# Patient Record
Sex: FEMALE | Race: BLACK OR AFRICAN AMERICAN | Employment: FULL TIME | URBAN - METROPOLITAN AREA
[De-identification: names, ages, dates, MRNs, and addresses within clinical notes are randomized per-mention and may not be internally consistent; named-entity substitution may affect disease eponyms.]

---

## 2017-04-21 ENCOUNTER — APPOINTMENT (OUTPATIENT)
Dept: GENERAL RADIOLOGY | Age: 27
End: 2017-04-21
Attending: EMERGENCY MEDICINE
Payer: SUBSIDIZED

## 2017-04-21 ENCOUNTER — HOSPITAL ENCOUNTER (EMERGENCY)
Age: 27
Discharge: HOME | End: 2017-04-21
Attending: STUDENT IN AN ORGANIZED HEALTH CARE EDUCATION/TRAINING PROGRAM
Payer: SUBSIDIZED

## 2017-04-21 VITALS
HEIGHT: 63 IN | BODY MASS INDEX: 51.03 KG/M2 | WEIGHT: 288 LBS | RESPIRATION RATE: 16 BRPM | SYSTOLIC BLOOD PRESSURE: 167 MMHG | OXYGEN SATURATION: 98 % | DIASTOLIC BLOOD PRESSURE: 95 MMHG | HEART RATE: 66 BPM | TEMPERATURE: 97.7 F

## 2017-04-21 DIAGNOSIS — M79.89 FOOT SWELLING: Primary | ICD-10-CM

## 2017-04-21 LAB
ALBUMIN SERPL BCP-MCNC: 3.4 G/DL (ref 3.5–5)
ALBUMIN/GLOB SERPL: 0.7 {RATIO} (ref 1.1–2.2)
ALP SERPL-CCNC: 50 U/L (ref 45–117)
ALT SERPL-CCNC: 24 U/L (ref 12–78)
ANION GAP BLD CALC-SCNC: 6 MMOL/L (ref 5–15)
APPEARANCE UR: CLEAR
AST SERPL W P-5'-P-CCNC: 20 U/L (ref 15–37)
BACTERIA URNS QL MICRO: NEGATIVE /HPF
BASOPHILS # BLD AUTO: 0 K/UL (ref 0–0.1)
BASOPHILS # BLD: 1 % (ref 0–1)
BILIRUB SERPL-MCNC: 0.2 MG/DL (ref 0.2–1)
BILIRUB UR QL: NEGATIVE
BNP SERPL-MCNC: 19 PG/ML (ref 0–100)
BUN SERPL-MCNC: 14 MG/DL (ref 6–20)
BUN/CREAT SERPL: 15 (ref 12–20)
CALCIUM SERPL-MCNC: 9.7 MG/DL (ref 8.5–10.1)
CHLORIDE SERPL-SCNC: 100 MMOL/L (ref 97–108)
CO2 SERPL-SCNC: 30 MMOL/L (ref 21–32)
COLOR UR: NORMAL
CREAT SERPL-MCNC: 0.95 MG/DL (ref 0.55–1.02)
EOSINOPHIL # BLD: 0.1 K/UL (ref 0–0.4)
EOSINOPHIL NFR BLD: 2 % (ref 0–7)
EPITH CASTS URNS QL MICRO: NORMAL /LPF
ERYTHROCYTE [DISTWIDTH] IN BLOOD BY AUTOMATED COUNT: 14.5 % (ref 11.5–14.5)
GLOBULIN SER CALC-MCNC: 4.7 G/DL (ref 2–4)
GLUCOSE SERPL-MCNC: 95 MG/DL (ref 65–100)
GLUCOSE UR STRIP.AUTO-MCNC: NEGATIVE MG/DL
HCG UR QL: NEGATIVE
HCT VFR BLD AUTO: 35.5 % (ref 35–47)
HGB BLD-MCNC: 11.3 G/DL (ref 11.5–16)
HGB UR QL STRIP: NEGATIVE
KETONES UR QL STRIP.AUTO: NEGATIVE MG/DL
LEUKOCYTE ESTERASE UR QL STRIP.AUTO: NEGATIVE
LYMPHOCYTES # BLD AUTO: 47 % (ref 12–49)
LYMPHOCYTES # BLD: 2.9 K/UL (ref 0.8–3.5)
MCH RBC QN AUTO: 28.8 PG (ref 26–34)
MCHC RBC AUTO-ENTMCNC: 31.8 G/DL (ref 30–36.5)
MCV RBC AUTO: 90.6 FL (ref 80–99)
MONOCYTES # BLD: 0.3 K/UL (ref 0–1)
MONOCYTES NFR BLD AUTO: 5 % (ref 5–13)
NEUTS SEG # BLD: 2.9 K/UL (ref 1.8–8)
NEUTS SEG NFR BLD AUTO: 45 % (ref 32–75)
NITRITE UR QL STRIP.AUTO: NEGATIVE
PH UR STRIP: 6 [PH] (ref 5–8)
PLATELET # BLD AUTO: 338 K/UL (ref 150–400)
POTASSIUM SERPL-SCNC: 3.5 MMOL/L (ref 3.5–5.1)
PROT SERPL-MCNC: 8.1 G/DL (ref 6.4–8.2)
PROT UR STRIP-MCNC: NEGATIVE MG/DL
RBC # BLD AUTO: 3.92 M/UL (ref 3.8–5.2)
RBC #/AREA URNS HPF: NORMAL /HPF (ref 0–5)
SODIUM SERPL-SCNC: 136 MMOL/L (ref 136–145)
SP GR UR REFRACTOMETRY: 1.02 (ref 1–1.03)
UROBILINOGEN UR QL STRIP.AUTO: 0.2 EU/DL (ref 0.2–1)
WBC # BLD AUTO: 6.3 K/UL (ref 3.6–11)
WBC URNS QL MICRO: NORMAL /HPF (ref 0–4)

## 2017-04-21 PROCEDURE — 71020 XR CHEST PA LAT: CPT

## 2017-04-21 PROCEDURE — 99283 EMERGENCY DEPT VISIT LOW MDM: CPT

## 2017-04-21 PROCEDURE — 83880 ASSAY OF NATRIURETIC PEPTIDE: CPT | Performed by: EMERGENCY MEDICINE

## 2017-04-21 PROCEDURE — 81001 URINALYSIS AUTO W/SCOPE: CPT | Performed by: EMERGENCY MEDICINE

## 2017-04-21 PROCEDURE — 85025 COMPLETE CBC W/AUTO DIFF WBC: CPT | Performed by: EMERGENCY MEDICINE

## 2017-04-21 PROCEDURE — 36415 COLL VENOUS BLD VENIPUNCTURE: CPT | Performed by: EMERGENCY MEDICINE

## 2017-04-21 PROCEDURE — 81025 URINE PREGNANCY TEST: CPT

## 2017-04-21 PROCEDURE — 80053 COMPREHEN METABOLIC PANEL: CPT | Performed by: EMERGENCY MEDICINE

## 2017-04-21 NOTE — ED TRIAGE NOTES
Patient presents to the emergency department reporting left leg swelling for a week. Patient states she has been seen at urgent care twice. She has been on an antibiotic and had an ultrasound for blood clot. Ultrasound was negative. Patient denies shortness of breath.

## 2017-04-22 NOTE — DISCHARGE INSTRUCTIONS
We hope that we have addressed all of your medical concerns. The examination and treatment you received in the Emergency Department were for an emergent problem and were not intended as complete care. It is important that you follow up with your healthcare provider(s) for ongoing care. If your symptoms worsen or do not improve as expected, and you are unable to reach your usual health care provider(s), you should return to the Emergency Department. Today's healthcare is undergoing tremendous change, and patient satisfaction surveys are one of the many tools to assess the quality of medical care. You may receive a survey from the [facility] regarding your experience in the Emergency Department. I hope that your experience has been completely positive, particularly the medical care that I provided. As such, please participate in the survey; anything less than excellent does not meet my expectations or intentions. Replaced by [facility] and [address] [place] [address] participate in nationally recognized quality of care measures. If your blood pressure is greater than 120/80, as reported below, we urge that you seek medical care to address the potential of high blood pressure, commonly known as hypertension. Hypertension can be hereditary or can be caused by certain medical conditions, pain, stress, or \"white coat syndrome. \"       Please make an appointment with your health care provider(s) for follow up of your Emergency Department visit. VITALS:   Patient Vitals for the past 8 hrs:   Temp Pulse Resp BP SpO2   04/21/17 1948 98.1 °F (36.7 °C) 67 16 157/87 95 %          Thank you for allowing us to provide you with medical care today. We realize that you have many choices for your emergency care needs. Please choose us in the future for any continued health care needs. Verona Rodrigues, [address] [place] [address] [address]   Office: [phone]            Recent Results (from the past 24 hour(s))   CBC WITH AUTOMATED DIFF    Collection Time: 04/21/17  8:12 PM   Result Value Ref Range    WBC 6.3 3.6 - 11.0 K/uL    RBC 3.92 3.80 - 5.20 M/uL    HGB 11.3 (L) 11.5 - 16.0 g/dL    HCT 35.5 35.0 - 47.0 %    MCV 90.6 80.0 - 99.0 FL    MCH 28.8 26.0 - 34.0 PG    MCHC 31.8 30.0 - 36.5 g/dL    RDW 14.5 11.5 - 14.5 %    PLATELET 814 248 - 624 K/uL    NEUTROPHILS 45 32 - 75 %    LYMPHOCYTES 47 12 - 49 %    MONOCYTES 5 5 - 13 %    EOSINOPHILS 2 0 - 7 %    BASOPHILS 1 0 - 1 %    ABS. NEUTROPHILS 2.9 1.8 - 8.0 K/UL    ABS. LYMPHOCYTES 2.9 0.8 - 3.5 K/UL    ABS. MONOCYTES 0.3 0.0 - 1.0 K/UL    ABS. EOSINOPHILS 0.1 0.0 - 0.4 K/UL    ABS. BASOPHILS 0.0 0.0 - 0.1 K/UL   METABOLIC PANEL, COMPREHENSIVE    Collection Time: 04/21/17  8:12 PM   Result Value Ref Range    Sodium 136 136 - 145 mmol/L    Potassium 3.5 3.5 - 5.1 mmol/L    Chloride 100 97 - 108 mmol/L    CO2 30 21 - 32 mmol/L    Anion gap 6 5 - 15 mmol/L    Glucose 95 65 - 100 mg/dL    BUN 14 6 - 20 MG/DL    Creatinine 0.95 0.55 - 1.02 MG/DL    BUN/Creatinine ratio 15 12 - 20      GFR est AA >60 >60 ml/min/1.73m2    GFR est non-AA >60 >60 ml/min/1.73m2    Calcium 9.7 8.5 - 10.1 MG/DL    Bilirubin, total 0.2 0.2 - 1.0 MG/DL    ALT (SGPT) 24 12 - 78 U/L    AST (SGOT) 20 15 - 37 U/L    Alk.  phosphatase 50 45 - 117 U/L    Protein, total 8.1 6.4 - 8.2 g/dL    Albumin 3.4 (L) 3.5 - 5.0 g/dL    Globulin 4.7 (H) 2.0 - 4.0 g/dL    A-G Ratio 0.7 (L) 1.1 - 2.2     BNP    Collection Time: 04/21/17  8:12 PM   Result Value Ref Range    BNP 19 0 - 100 pg/mL   URINALYSIS W/MICROSCOPIC    Collection Time: 04/21/17  9:08 PM   Result Value Ref Range    Color YELLOW/STRAW      Appearance CLEAR CLEAR      Specific gravity 1.024 1.003 - 1.030      pH (UA) 6.0 5.0 - 8.0      Protein NEGATIVE  NEG mg/dL    Glucose NEGATIVE  NEG mg/dL    Ketone NEGATIVE  NEG mg/dL    Bilirubin NEGATIVE  NEG      Blood NEGATIVE  NEG      Urobilinogen 0.2 0.2 - 1.0 EU/dL    Nitrites NEGATIVE  NEG      Leukocyte Esterase NEGATIVE  NEG      WBC 0-4 0 - 4 /hpf    RBC 0-5 0 - 5 /hpf    Epithelial cells FEW FEW /lpf    Bacteria NEGATIVE  NEG /hpf   HCG URINE, QL. - POC    Collection Time: 04/21/17  9:09 PM   Result Value Ref Range    Pregnancy test,urine (POC) NEGATIVE  NEG         Xr Chest Pa Lat    Result Date: 4/21/2017  CLINICAL HISTORY: Foot swelling INDICATION: Foot swelling, history of asthma COMPARISON: None FINDINGS: PA and lateral views of the chest are obtained. The cardiopericardial silhouette is within normal limits. There is no pleural effusion, pneumothorax or focal consolidation present. IMPRESSION: No acute intrathoracic disease. Leg and Ankle Edema: Care Instructions  Your Care Instructions  Swelling in the legs, ankles, and feet is called edema. It is common after you sit or stand for a while. Long plane flights or car rides often cause swelling in the legs and feet. You may also have swelling if you have to stand for long periods of time at your job. Problems with the veins in the legs (varicose veins) and changes in hormones can also cause swelling. Sometimes the swelling in the ankles and feet is caused by a more serious problem, such as heart failure, infection, blood clots, or liver or kidney disease. Follow-up care is a key part of your treatment and safety. Be sure to make and go to all appointments, and call your doctor if you are having problems. Its also a good idea to know your test results and keep a list of the medicines you take. How can you care for yourself at home? · If your doctor gave you medicine, take it as prescribed. Call your doctor if you think you are having a problem with your medicine. · Whenever you are resting, raise your legs up. Try to keep the swollen area higher than the level of your heart. · Take breaks from standing or sitting in one position.   ¨ Walk around to increase the blood flow in your lower legs. ¨ Move your feet and ankles often while you stand, or tighten and relax your leg muscles. · Wear support stockings. Put them on in the morning, before swelling gets worse. · Eat a balanced diet. Lose weight if you need to. · Limit the amount of salt (sodium) in your diet. Salt holds fluid in the body and may increase swelling. When should you call for help? Call 911 anytime you think you may need emergency care. For example, call if:  · You have symptoms of a blood clot in your lung (called a pulmonary embolism). These may include:  ¨ Sudden chest pain. ¨ Trouble breathing. ¨ Coughing up blood. Call your doctor now or seek immediate medical care if:  · You have signs of a blood clot, such as:  ¨ Pain in your calf, back of the knee, thigh, or groin. ¨ Redness and swelling in your leg or groin. · You have symptoms of infection, such as:  ¨ Increased pain, swelling, warmth, or redness. ¨ Red streaks or pus. ¨ A fever. Watch closely for changes in your health, and be sure to contact your doctor if:  · Your swelling is getting worse. · You have new or worsening pain in your legs. · You do not get better as expected. Where can you learn more? Go to http://ruben-frank.info/. Enter S548 in the search box to learn more about \"Leg and Ankle Edema: Care Instructions. \"  Current as of: May 27, 2016  Content Version: 11.2  © 8035-3929 [employer]. Care instructions adapted under license by [employer] (which disclaims liability or warranty for this information). If you have questions about a medical condition or this instruction, always ask your healthcare professional. [place] [address] any warranty or liability for your use of this information.

## 2017-04-22 NOTE — ED PROVIDER NOTES
HPI Comments: 31 y.o. female with past medical history significant for HTN, asthma and migraines who presents from home with chief complaint of leg swelling. Pt complains of swelling and tenderness in her left foot for one week. Pt states that she has been able to ambulate with difficulty. Pt also complains of chills last Friday (~one week ago). Pt reports that she was told that she has a \"skin infection\" by one doctor and was prescribed an antibiotic, which caused an allergic reaction. Pt states that she was then changed to another antibiotic, which caused a similar reaction. Pt reports that she had an ultrasound done at [address] [place] [address] that showed negative results. Pt also reports that she is waiting for results of an x-ray that was done at [facility] [address]. Pt denies any recent fall or injury to the foot. Pt also denies any knee pain, SOB or abdominal pain. There are no other acute medical concerns at this time. Pain rated 8/10. PCP: Elva Lange MD    Social hx  Nonsmoker  No alcohol    Note written by Kathie Kang, as dictated by SANDRA Wagoner 8:17 PM        The history is provided by the patient. No  was used. Past Medical History:   Diagnosis Date    Asthma     Hypertension     Neurological disorder     Migraines       History reviewed. No pertinent surgical history. Family History:   Problem Relation Age of Onset    Heart Disease Mother     Hypertension Mother    Dewey Sanchez Arthritis-osteo Father     Hypertension Father        Social History     Social History    Marital status: SINGLE     Spouse name: N/A    Number of children: N/A    Years of education: N/A     Occupational History    Not on file.      Social History Main Topics    Smoking status: Never Smoker    Smokeless tobacco: Never Used    Alcohol use No    Drug use: No    Sexual activity: Yes     Partners: Male     Other Topics Concern    Not on file     Social History Narrative ALLERGIES: Review of patient's allergies indicates no known allergies. Review of Systems   Constitutional: Negative for appetite change and fever. Chills: resolved. HENT: Negative for congestion, rhinorrhea and sore throat. Respiratory: Negative for chest tightness and shortness of breath. Cardiovascular: Positive for leg swelling. Negative for chest pain and palpitations. Gastrointestinal: Negative for abdominal pain, diarrhea, nausea and vomiting. Genitourinary: Negative for difficulty urinating, dysuria, pelvic pain and urgency. Musculoskeletal: Negative for back pain, gait problem, neck pain and neck stiffness. Foot pain   Skin: Positive for color change. Neurological: Negative for dizziness, weakness, light-headedness and headaches. All other systems reviewed and are negative. Vitals:    04/21/17 1948   BP: 157/87   Pulse: 67   Resp: 16   Temp: 98.1 °F (36.7 °C)   SpO2: 95%   Weight: 130.6 kg (288 lb)   Height: 5' 3\" (1.6 m)            Physical Exam   Constitutional: She is oriented to person, place, and time. She appears well-developed and well-nourished. No distress. HENT:   Head: Normocephalic and atraumatic. Right Ear: External ear normal.   Left Ear: External ear normal.   Eyes: Conjunctivae and EOM are normal. Pupils are equal, round, and reactive to light. Neck: Normal range of motion. Neck supple. Cardiovascular: Normal rate and regular rhythm. Pulmonary/Chest: Effort normal and breath sounds normal.   Musculoskeletal: Normal range of motion. Left foot:  No redness or warmth. Soft tissue swelling 2+ pitting edema to foot and ankle. No calf tenderness. No open sores or lesions to leg. Tender to palpation. Neurological: She is alert and oriented to person, place, and time. No cranial nerve deficit. She exhibits normal muscle tone. Skin: Skin is warm and dry. No rash noted. No erythema. Psychiatric: She has a normal mood and affect.  Her behavior is normal. Judgment and thought content normal.   Nursing note and vitals reviewed. MDM  Number of Diagnoses or Management Options  Foot swelling:   Diagnosis management comments: 30-year-old female presenting to the emergency department for evaluation of left foot swelling for one week. No fever or chills. No chest pain or shortness of breath. There is 2+ pitting edema of the left foot and ankle. She is neurovascularly intact. No warmth. No signs of infection. No open wounds. Plan: Chest x-ray, labs, urinalysis    Labs and imaging unremarkable. Pt has had negative xray of foot and ultrasound of leg today. Will discharge home with elevation of foot and continuation of antibiotics at home. Patient's results have been reviewed with them. Patient and/or family have verbally conveyed their understanding and agreement of the patient's signs, symptoms, diagnosis, treatment and prognosis and additionally agree to follow up as recommended or return to the Emergency Room should their condition change prior to follow-up. Discharge instructions have also been provided to the patient with some educational information regarding their diagnosis as well a list of reasons why they would want to return to the ER prior to their follow-up appointment should their condition change. Amount and/or Complexity of Data Reviewed  Discuss the patient with other providers: yes (ER attending-Amado)    Patient Progress  Patient progress: stable    ED Course       Procedures         Pt case including HPI, PE, and all available lab and radiology results has been discussed with attending physician. Opportunity to evaluate patient has been provided to ER attending. Discharge and prescription plan has been agreed upon.

## 2017-04-26 ENCOUNTER — PATIENT OUTREACH (OUTPATIENT)
Dept: INTERNAL MEDICINE CLINIC | Age: 27
End: 2017-04-26

## 2017-04-26 NOTE — PROGRESS NOTES
NNTOCED      Date/Time:  4/26/2017 11:37 AM    Patient listed on discharge [facility])   Patient discharged from [facility] for Foot Swelling. NN LM on  for return call. Needs to verify Care Card Application and Post ED Discharge Assessment. Past Medical History:   Diagnosis Date    Asthma     Hypertension     Neurological disorder     Migraines     Case management plan: Attempt to contact the patient by telephone or during office visit within the next 7-10 days. Will continue to follow as necessary for the next 30 days. Will reassess for case management needs prior to discharge from case management service on or about 30 days. Red Flags:  Keep leg elevated to minimize swelling. Continue your antibiotics and pain medicine. Return to ER for any fever, chills, redness or warmth to foot or leg, chest pain, shortness of breath.

## 2017-06-13 ENCOUNTER — OFFICE VISIT (OUTPATIENT)
Dept: INTERNAL MEDICINE CLINIC | Age: 27
End: 2017-06-13

## 2017-06-13 VITALS
HEIGHT: 63 IN | SYSTOLIC BLOOD PRESSURE: 143 MMHG | WEIGHT: 290 LBS | OXYGEN SATURATION: 97 % | HEART RATE: 65 BPM | BODY MASS INDEX: 51.38 KG/M2 | RESPIRATION RATE: 20 BRPM | TEMPERATURE: 97.8 F | DIASTOLIC BLOOD PRESSURE: 96 MMHG

## 2017-06-13 DIAGNOSIS — K62.5 BRIGHT RED BLOOD PER RECTUM: ICD-10-CM

## 2017-06-13 DIAGNOSIS — R19.5 FECAL OCCULT BLOOD TEST POSITIVE: Primary | ICD-10-CM

## 2017-06-13 DIAGNOSIS — R10.31 RIGHT LOWER QUADRANT ABDOMINAL PAIN: ICD-10-CM

## 2017-06-13 LAB
HEMOCCULT STL QL: POSITIVE
VALID INTERNAL CONTROL?: YES

## 2017-06-13 RX ORDER — AMOXICILLIN AND CLAVULANATE POTASSIUM 875; 125 MG/1; MG/1
1 TABLET, FILM COATED ORAL EVERY 12 HOURS
Qty: 20 TAB | Refills: 0 | Status: SHIPPED | OUTPATIENT
Start: 2017-06-13 | End: 2017-06-23

## 2017-06-13 NOTE — PROGRESS NOTES
Ms. Disha Wakefield is a 31y.o. year old female who had concerns including Other and Abdominal Pain. HPI:  Chief Complaint   Patient presents with    Other     bloody stools    Abdominal Pain     around the umbilicus     Went to Er, 4 bloody stools since yesterday . BRBPR,  In toilet stool, no just with wiping. No current pain, but it was presetntprior to ER visit. Started on RLW, then hsarp shooting around epigastric pain     CT scan - no appendicitis. Constipation last  Night, none prior. Patient reports that she is on a medication she does not recall the name of it. Past Medical History:   Diagnosis Date    Asthma     Hypertension     Neurological disorder     Migraines     Current Outpatient Prescriptions   Medication Sig Dispense    albuterol (PROVENTIL HFA, VENTOLIN HFA, PROAIR HFA) 90 mcg/actuation inhaler Take 2 Puffs by inhalation every four (4) hours as needed for Wheezing. 1 Inhaler    methylPREDNISolone (MEDROL, STELLA,) 4 mg tablet As directed 1 Dose Pack    SUMAtriptan (IMITREX) 20 mg/actuation nasal spray 1 [place] by Both Nostrils route as needed for Migraine. 1 spray in each nostril x1;  may repeat dose x1 after 2h  Indications: MIGRAINE 1 Container    topiramate (TOPAMAX) 50 mg tablet Take 1 Tab by mouth two (2) times a day. 60 Tab    lisinopril (PRINIVIL, ZESTRIL) 40 mg tablet Take 0.5 Tabs by mouth daily. 90 Tab    ergocalciferol (ERGOCALCIFEROL) 50,000 unit capsule Take 1 capsule daily x 7 days, then take 1 capsule weekly x 7 weeks. Indications: VITAMIN D DEFICIENCY (HIGH DOSE THERAPY) 14 Cap    hydrochlorothiazide (HYDRODIURIL) 25 mg tablet Take 1 Tab by mouth daily. Indications: HYPERTENSION 90 Tab     No current facility-administered medications for this visit. Reviewed PmHx, RxHx, FmHx, SocHx, AllgHx and updated and dated in the chart.     ROS: Negative except for BOLD  General: fever, chills, fatigue  Respiratory: cough, SOB, wheezing  Cardiovascular:  CP, palpitation, MANZO, edema   Gastrointestinal: N/V/D, bleeding  Genito-Urinary: dysuria, hematuria  Musculoskeletal: muscle weakness, pain, swelling    OBJECTIVE:   Visit Vitals    BP (!) 143/96 (BP 1 Location: Right arm, BP Patient Position: Sitting)    Pulse 65    Temp 97.8 °F (36.6 °C) (Oral)    Resp 20    Ht 5' 3\" (1.6 m)    Wt 290 lb (131.5 kg)    SpO2 97%    BMI 51.37 kg/m2     GEN: The patient appears well, alert, oriented x 3, in mild discomfort. ENT: bilateral TM and canal normal.  Neck supple. No adenopathy or thyromegaly. CAM[place]. Lungs: clear bilaterally, good air entry, no wheezes, rhonchi or rales. Cardiovascular: regular rate and rhythm. S1 and S2 normal, no murmurs,  Abdomen: + BS, soft with hypogastric tenderness, guarding, rebound, mass or organomegaly. FOBT positive   CV: extremities: no edema, normal peripheral pulses. Neurological: normal, gross sensory and motor in tact without focal findings. Assessment/ Plan:       ICD-10-CM ICD-9-CM    1. Fecal occult blood test positive R19.5 792.1 REFERRAL TO GASTROENTEROLOGY      AMB POC FECAL BLOOD, OCCULT, QL 1 CARD   2. Bright red blood per rectum K62.5 569.3 REFERRAL TO GASTROENTEROLOGY      AMB POC FECAL BLOOD, OCCULT, QL 1 CARD   3. Right lower quadrant abdominal pain R10.31 789.03 REFERRAL TO GASTROENTEROLOGY       Medical release form for him Lynda for abdominal CT scan. Refer for GI to determine cause of GI bleed. Afebrile CT scan reportedly negative for colitis and diverticulitis. Hosptial medication - ketorolac, pt admonished to avoid NSAIDs due to GI bleed risk worsening    I have discussed the diagnosis with the patient and the intended plan as seen in the above orders. The patient has received an after-visit summary and questions were answered concerning future plans. Medication Side Effects and Warnings were discussed with patient. Follow-up Disposition:  Return if symptoms worsen or fail to improve.       Will Low Christiano Hilliard MD

## 2017-06-13 NOTE — PATIENT INSTRUCTIONS
Rectal Bleeding: Care Instructions  Your Care Instructions  Rectal bleeding in small amounts is common. You may see red spotting on toilet paper or drops of blood in the toilet. Rectal bleeding has many possible causes, from something as minor as hemorrhoids to something as serious as colon cancer. You may need more tests to find the cause of your bleeding. Follow-up care is a key part of your treatment and safety. Be sure to make and go to all appointments, and call your doctor if you are having problems. Its also a good idea to know your test results and keep a list of the medicines you take. How can you care for yourself at home? · Avoid aspirin and other nonsteroidal anti-inflammatory drugs (NSAIDs), such as ibuprofen (Advil, Motrin) and naproxen (Aleve). They can cause you to bleed more. Ask your doctor if you can take acetaminophen (Tylenol). Read and follow all instructions on the label. · Use a stool softener that contains bran or psyllium. You can save money by buying bran or psyllium (available in bulk at most health food stores) and sprinkling it on foods or stirring it into fruit juice. You can also use a product such as Metamucil or Citrucel. · Take your medicines exactly as directed. Call your doctor if you think you are having a problem with your medicine. When should you call for help? Call 911 anytime you think you may need emergency care. For example, call if:  · You passed out (lost consciousness). · You pass maroon or very bloody stools. · You vomit blood or what looks like coffee grounds. Call your doctor now or seek immediate medical care if:  · You have severe belly pain. · You have increased bleeding. · You are dizzy or lightheaded, or you feel like you may faint. · Your stools are black and tarlike. Watch closely for changes in your health, and be sure to contact your doctor if:  · You lose weight and do not know why. · You feel tired all the time.   · Your bleeding does not decrease after 2 days. Where can you learn more? Go to http://ruben-frank.info/. Enter A298 in the search box to learn more about \"Rectal Bleeding: Care Instructions. \"  Current as of: August 9, 2016  Content Version: 11.2  © 1664-5826 [employer]. Care instructions adapted under license by [employer] (which disclaims liability or warranty for this information). If you have questions about a medical condition or this instruction, always ask your healthcare professional. [address] any warranty or liability for your use of this information.

## 2017-06-13 NOTE — MR AVS SNAPSHOT
Visit Information Date & Time Provider Department Dept. Phone Encounter #  
 6/13/2017  3:00 PM Idalmis Montoya MD [facility] and [place] [id number] [id number] Follow-up Instructions Return if symptoms worsen or fail to improve. Follow-up and Disposition History Upcoming Health Maintenance Date Due DTaP/Tdap/Td series (1 - Tdap) 11/9/2010 INFLUENZA AGE 9 TO ADULT 8/1/2017 PAP AKA CERVICAL CYTOLOGY 5/18/2019 Allergies as of 6/13/2017  Review Complete On: 6/13/2017 By: Isa Puga LPN No Known Allergies Current Immunizations  Never Reviewed No immunizations on file. Not reviewed this visit You Were Diagnosed With   
  
 Codes Comments Fecal occult blood test positive    -  Primary ICD-10-CM: R19.5 ICD-9-CM: 792.1 Bright red blood per rectum     ICD-10-CM: K62.5 ICD-9-CM: 569.3 Right lower quadrant abdominal pain     ICD-10-CM: R10.31 ICD-9-CM: 789.03 Vitals BP Pulse Temp Resp Height(growth percentile) Weight(growth percentile) (!) 143/96 (BP 1 Location: Right arm, BP Patient Position: Sitting) 65 97.8 °F (36.6 °C) (Oral) 20 5' 3\" (1.6 m) 290 lb (131.5 kg) SpO2 BMI OB Status Smoking Status 97% 51.37 kg/m2 Unknown Never Smoker Vitals History BMI and BSA Data Body Mass Index Body Surface Area  
 51.37 kg/m 2 2.42 m 2 Preferred Pharmacy Pharmacy Name Phone [address] [phone] Your Updated Medication List  
  
   
This list is accurate as of: 6/13/17  4:08 PM.  Always use your most recent med list.  
  
  
  
  
 albuterol 90 mcg/actuation inhaler Commonly known as:  PROVENTIL HFA, VENTOLIN HFA, PROAIR HFA Take 2 Puffs by inhalation every four (4) hours as needed for Wheezing. amoxicillin-clavulanate 875-125 mg per tablet Commonly known as:  AUGMENTIN  
 Take 1 Tab by mouth every twelve (12) hours for 10 days. ergocalciferol 50,000 unit capsule Commonly known as:  ERGOCALCIFEROL Take 1 capsule daily x 7 days, then take 1 capsule weekly x 7 weeks. Indications: VITAMIN D DEFICIENCY (HIGH DOSE THERAPY)  
  
 hydroCHLOROthiazide 25 mg tablet Commonly known as:  HYDRODIURIL Take 1 Tab by mouth daily. Indications: HYPERTENSION  
  
 lisinopril 40 mg tablet Commonly known as:  Dany Apo Take 0.5 Tabs by mouth daily. methylPREDNISolone 4 mg tablet Commonly known as:  MEDROL (STELLA) As directed SUMAtriptan 20 mg/actuation nasal spray Commonly known as:  IMITREX  
1 [place] by Both Nostrils route as needed for Migraine. 1 spray in each nostril x1;  may repeat dose x1 after 2h  Indications: MIGRAINE  
  
 topiramate 50 mg tablet Commonly known as:  TOPAMAX Take 1 Tab by mouth two (2) times a day. Prescriptions Sent to Pharmacy Refills  
 amoxicillin-clavulanate (AUGMENTIN) 875-125 mg per tablet 0 Sig: Take 1 Tab by mouth every twelve (12) hours for 10 days. Class: Normal  
 Pharmacy: [address], [address] [place] [address] Ph #: [phone] Route: Oral  
  
We Performed the Following AMB POC FECAL BLOOD, OCCULT, QL 1 CARD [27541 CPT(R)] REFERRAL TO GASTROENTEROLOGY [VHE61 Custom] Comments:  
 Bright red blood per rectum, fecal occult blood test positive. Abdominal pain. Evaluate cause of GI bleed. Follow-up Instructions Return if symptoms worsen or fail to improve. Referral Information Referral ID Referred By Referred To  
  
 [id number] [facility] [address] [place] [address] [phone] Visits Status Start Date End Date 1 New Request 6/13/17 6/13/18  If your referral has a status of pending review or denied, additional information will be sent to support the outcome of this decision. Patient Instructions Rectal Bleeding: Care Instructions Your Care Instructions Rectal bleeding in small amounts is common. You may see red spotting on toilet paper or drops of blood in the toilet. Rectal bleeding has many possible causes, from something as minor as hemorrhoids to something as serious as colon cancer. You may need more tests to find the cause of your bleeding. Follow-up care is a key part of your treatment and safety. Be sure to make and go to all appointments, and call your doctor if you are having problems. Its also a good idea to know your test results and keep a list of the medicines you take. How can you care for yourself at home? · Avoid aspirin and other nonsteroidal anti-inflammatory drugs (NSAIDs), such as ibuprofen (Advil, Motrin) and naproxen (Aleve). They can cause you to bleed more. Ask your doctor if you can take acetaminophen (Tylenol). Read and follow all instructions on the label. · Use a stool softener that contains bran or psyllium. You can save money by buying bran or psyllium (available in bulk at most health food stores) and sprinkling it on foods or stirring it into fruit juice. You can also use a product such as Metamucil or Citrucel. · Take your medicines exactly as directed. Call your doctor if you think you are having a problem with your medicine. When should you call for help? Call 911 anytime you think you may need emergency care. For example, call if: 
· You passed out (lost consciousness). · You pass maroon or very bloody stools. · You vomit blood or what looks like coffee grounds. Call your doctor now or seek immediate medical care if: 
· You have severe belly pain. · You have increased bleeding. · You are dizzy or lightheaded, or you feel like you may faint. · Your stools are black and tarlike.  
Watch closely for changes in your health, and be sure to contact your doctor if: 
· You lose weight and do not know why. · You feel tired all the time. · Your bleeding does not decrease after 2 days. Where can you learn more? Go to http://ruben-frank.info/. Enter U353 in the search box to learn more about \"Rectal Bleeding: Care Instructions. \" Current as of: August 9, 2016 Content Version: 11.2 © 7575-8235 Restore Water. Care instructions adapted under license by [employer] (which disclaims liability or warranty for this information). If you have questions about a medical condition or this instruction, always ask your healthcare professional. [address] any warranty or liability for your use of this information. Introducing [facility] & HEALTH SERVICES! Dear Tea Higginbotham: 
Thank you for requesting a SteelCloud account. Our records indicate that you already have an active SteelCloud account. You can access your account anytime at [email or web address] Did you know that you can access your hospital and ER discharge instructions at any time in SteelCloud? You can also review all of your test results from your hospital stay or ER visit. Additional Information If you have questions, please visit the Frequently Asked Questions section of the SteelCloud website at [email or web address] Remember, SteelCloud is NOT to be used for urgent needs. For medical emergencies, dial 911. Now available from your iPhone and Android! Please provide this summary of care documentation to your next provider. Your primary care clinician is listed as Tomasz Beckett. If you have any questions after today's visit, please call [phone].

## 2017-06-22 DIAGNOSIS — G43.709 CHRONIC MIGRAINE WITHOUT AURA WITHOUT STATUS MIGRAINOSUS, NOT INTRACTABLE: ICD-10-CM

## 2017-06-22 DIAGNOSIS — G93.2 PSEUDOTUMOR CEREBRI: ICD-10-CM

## 2017-06-22 RX ORDER — TOPIRAMATE 50 MG/1
50 TABLET, FILM COATED ORAL 2 TIMES DAILY
Qty: 60 TAB | Refills: 2 | Status: SHIPPED | OUTPATIENT
Start: 2017-06-22 | End: 2018-03-06 | Stop reason: SDUPTHER

## 2017-06-26 ENCOUNTER — OFFICE VISIT (OUTPATIENT)
Dept: INTERNAL MEDICINE CLINIC | Age: 27
End: 2017-06-26

## 2017-06-26 VITALS
HEART RATE: 72 BPM | WEIGHT: 292.2 LBS | HEIGHT: 63 IN | TEMPERATURE: 98.2 F | BODY MASS INDEX: 51.77 KG/M2 | SYSTOLIC BLOOD PRESSURE: 130 MMHG | RESPIRATION RATE: 17 BRPM | OXYGEN SATURATION: 93 % | DIASTOLIC BLOOD PRESSURE: 85 MMHG

## 2017-06-26 DIAGNOSIS — E28.2 PCOS (POLYCYSTIC OVARIAN SYNDROME): ICD-10-CM

## 2017-06-26 DIAGNOSIS — Z23 ENCOUNTER FOR IMMUNIZATION: ICD-10-CM

## 2017-06-26 DIAGNOSIS — E66.01 MORBID OBESITY DUE TO EXCESS CALORIES (HCC): ICD-10-CM

## 2017-06-26 DIAGNOSIS — I10 ESSENTIAL HYPERTENSION WITH GOAL BLOOD PRESSURE LESS THAN 140/90: Primary | ICD-10-CM

## 2017-06-26 RX ORDER — METFORMIN HYDROCHLORIDE 500 MG/1
500 TABLET ORAL 2 TIMES DAILY WITH MEALS
Qty: 60 TAB | Refills: 5 | Status: SHIPPED | OUTPATIENT
Start: 2017-06-26

## 2017-06-26 RX ORDER — METFORMIN HYDROCHLORIDE 500 MG/1
TABLET ORAL 2 TIMES DAILY WITH MEALS
COMMUNITY
End: 2017-06-26 | Stop reason: SDUPTHER

## 2017-06-26 RX ORDER — LISINOPRIL 40 MG/1
20 TABLET ORAL DAILY
Qty: 90 TAB | Refills: 4 | Status: CANCELLED | OUTPATIENT
Start: 2017-06-26

## 2017-06-26 RX ORDER — SUMATRIPTAN 100 MG/1
100 TABLET, FILM COATED ORAL
Status: CANCELLED | OUTPATIENT
Start: 2017-06-26

## 2017-06-26 RX ORDER — SUMATRIPTAN 100 MG/1
100 TABLET, FILM COATED ORAL
COMMUNITY

## 2017-06-26 RX ORDER — LISINOPRIL AND HYDROCHLOROTHIAZIDE 20; 25 MG/1; MG/1
1 TABLET ORAL DAILY
Qty: 90 TAB | Refills: 4 | Status: SHIPPED | OUTPATIENT
Start: 2017-06-26

## 2017-06-26 NOTE — MR AVS SNAPSHOT
Visit Information Date & Time Provider Department Dept. Phone Encounter #  
 6/26/2017  9:15 AM Nadir Burton MD [facility] and [place] [address] [id number] Follow-up Instructions Return in about 2 months (around 8/26/2017) for Weight Loss progress. Jacob Shore Follow-up and Disposition History Upcoming Health Maintenance Date Due DTaP/Tdap/Td series (1 - Tdap) 11/9/2010 INFLUENZA AGE 9 TO ADULT 8/1/2017 PAP AKA CERVICAL CYTOLOGY 5/18/2019 Allergies as of 6/26/2017  Review Complete On: 6/26/2017 By: Pushpa Marie No Known Allergies Current Immunizations  Never Reviewed Name Date Tdap  Incomplete Not reviewed this visit You Were Diagnosed With   
  
 Codes Comments Essential hypertension with goal blood pressure less than 140/90    -  Primary ICD-10-CM: I10 
ICD-9-CM: 401.9 PCOS (polycystic ovarian syndrome)     ICD-10-CM: E28.2 ICD-9-CM: 256.4 BMI 50.0-59.9, adult [facility])     ICD-10-CM: O48.52 
ICD-9-CM: V85.43 Morbid obesity due to excess calories (HCC)     ICD-10-CM: E66.01 
ICD-9-CM: 278.01 Encounter for immunization     ICD-10-CM: S60 ICD-9-CM: V03.89 Vitals BP Pulse Temp Resp Height(growth percentile) Weight(growth percentile) 130/85 (BP 1 Location: Left arm, BP Patient Position: Sitting) 72 98.2 °F (36.8 °C) (Oral) 17 5' 3\" (1.6 m) 292 lb 3.2 oz (132.5 kg) SpO2 BMI OB Status Smoking Status 93% 51.76 kg/m2 Unknown Never Smoker BMI and BSA Data Body Mass Index Body Surface Area 51.76 kg/m 2 2.43 m 2 Preferred Pharmacy Pharmacy Name Phone [facility] [address] , [address] [place] [address] [phone] Your Updated Medication List  
  
   
This list is accurate as of: 6/26/17 10:38 AM.  Always use your most recent med list.  
  
  
  
  
 albuterol 90 mcg/actuation inhaler Commonly known as:  PROVENTIL HFA, VENTOLIN HFA, PROAIR HFA Take 2 Puffs by inhalation every four (4) hours as needed for Wheezing.  
  
 ergocalciferol 50,000 unit capsule Commonly known as:  ERGOCALCIFEROL Take 1 capsule daily x 7 days, then take 1 capsule weekly x 7 weeks. Indications: VITAMIN D DEFICIENCY (HIGH DOSE THERAPY) IMITREX 100 mg tablet Generic drug:  SUMAtriptan Take 100 mg by mouth once as needed for Migraine. lisinopril-hydroCHLOROthiazide 20-25 mg per tablet Commonly known as:  [place] Take 1 Tab by mouth daily. metFORMIN 500 mg tablet Commonly known as:  GLUCOPHAGE Take 1 Tab by mouth two (2) times daily (with meals). Indications: POLYCYSTIC OVARIAN SYNDROME  
  
 methylPREDNISolone 4 mg tablet Commonly known as:  MEDROL (STELLA) As directed SUMAtriptan 20 mg/actuation nasal spray Commonly known as:  IMITREX  
1 [place] by Both Nostrils route as needed for Migraine. 1 spray in each nostril x1;  may repeat dose x1 after 2h  Indications: MIGRAINE  
  
 topiramate 50 mg tablet Commonly known as:  TOPAMAX Take 1 Tab by mouth two (2) times a day. Prescriptions Sent to Pharmacy Refills  
 metFORMIN (GLUCOPHAGE) 500 mg tablet 5 Sig: Take 1 Tab by mouth two (2) times daily (with meals). Indications: POLYCYSTIC OVARIAN SYNDROME Class: Normal  
 Pharmacy: [address],[facility] Floor, [address] [place] [address] Ph #: [phone] Route: Oral  
 lisinopril-hydroCHLOROthiazide (PRINZIDE, ZESTORETIC) 20-25 mg per tablet 4 Sig: Take 1 Tab by mouth daily. Class: Normal  
 Pharmacy: [address],[facility] Floor, [address] [place] [address] Ph #: [phone] Route: Oral  
  
We Performed the Following REFERRAL TO BARIATRIC SURGERY [OQE030 Custom] Comments:  
 Please evaluate patient for Body mass index is 51.76 kg/(m^2). mlorbidly obese, eval treatment options, nutrition education TETANUS, DIPHTHERIA TOXOIDS AND ACELLULAR PERTUSSIS VACCINE (TDAP), IN INDIVIDS. >=7, IM [id number] CPT(R)] Follow-up Instructions Return in about 2 months (around 8/26/2017) for Weight Loss progress. Nishi Formanramiropetra Referral Information Referral ID Referred By Referred To  
  
 [id number] ALHAJI, [address], [place]   
   [address] Phone: [phone] Fax: [phone] Visits Status Start Date End Date 1 New Request 6/26/17 6/26/18 If your referral has a status of pending review or denied, additional information will be sent to support the outcome of this decision. Patient Instructions Learning About Bariatric Surgery What is bariatric surgery? Bariatric surgery is surgery to help you lose weight. This type of surgery is only used for people who are very overweight and have not been able to lose weight with diet and exercise. This surgery makes the stomach smaller. Some types of surgery also change the connection between your stomach and intestines. How is bariatric surgery done? Bariatric surgery may be either \"open\" or \"laparoscopic. \" Open surgery is done through a large cut (incision) in the belly. Laparoscopic surgery is done through several small cuts. The doctor puts a lighted tube, or scope, and other surgical tools through small cuts in your belly. The doctor is able to see your organs with the scope. There are different types of bariatric surgery. Gastric sleeve surgery The surgery is usually done through several small incisions in the belly. The doctor removes more than half of your stomach. This leaves a thin sleeve, or tube, that is about the size of a banana. Because part of your stomach has been removed, this can't be reversed. Frank-en-Y gastric bypass surgery Frank-en-Y (say \"macey-en-why\") surgery changes the connection between the stomach and the intestines. The doctor separates a section of your stomach from the rest of your stomach. This makes a small pouch. The new pouch will hold the food you eat. The doctor connects the stomach pouch to the middle part of the small intestine. Gastric banding surgery The surgery is usually done through several small incisions in the belly. The doctor wraps a band around the upper part of the stomach. This creates a small pouch. The small size of the pouch means that you will get full after you eat just a small amount of food. The doctor can inflate or deflate the band to adjust the size. This lets the doctor adjust how quickly food passes from the new pouch into the stomach. It does not change the connection between the stomach and the intestines. What can you expect after the surgery? You may stay in the hospital for one or more days after the surgery. How long you stay depends on the type of surgery you had. Most people need 2 to 4 weeks before they are ready to get back to their usual routine. For the first 2 to 6 weeks after surgery, you probably will need to follow a liquid or soft diet. Bit by bit, you will be able to eat more solid foods. Your doctor may advise you to work with a dietitian. This way you'll be sure to get enough protein, vitamins, and minerals while you are losing weight. Even with a healthy diet, you may need to take vitamin and mineral supplements. After surgery, you will not be able to eat very much at one time. You will get full quickly. Try not to eat too much at one time or eat foods that are high in fat or sugar. If you do, you may vomit, get stomach pain, or have diarrhea. You probably will lose weight very quickly in the first few months after surgery. As time goes on, your weight loss will slow down. You will have regular doctor visits to check how you are doing. Think of bariatric surgery as a tool to help you lose weight.  It isn't an instant fix. You will still need to eat a healthy diet and get regular exercise. This will help you reach your weight goal and avoid regaining the weight you lose. Follow-up care is a key part of your treatment and safety. Be sure to make and go to all appointments, and call your doctor if you are having problems. It's also a good idea to know your test results and keep a list of the medicines you take. Where can you learn more? Go to http://ruben-frank.info/. Enter G469 in the search box to learn more about \"Learning About Bariatric Surgery. \" Current as of: October 13, 2016 Content Version: 11.3 © 4701-0251 [employer]. Care instructions adapted under license by Kenguru (which disclaims liability or warranty for this information). If you have questions about a medical condition or this instruction, always ask your healthcare professional. [place] [address] any warranty or liability for your use of this information. Learning About Cutting Calories How do calories affect your weight? Food gives your body energy. Energy from the food you eat is measured in calories. This energy keeps your heart beating, your brain active, and your muscles working. Your body needs a certain number of calories each day. After your body uses the calories it needs, it stores extra calories as fat. To lose weight safely, you have to eat fewer calories while eating in a healthy way. How many calories do you need each day? The more active you are, the more calories you need. When you are less active, you need fewer calories. How many calories you need each day also depends on several things, including your age and whether you are male or female. Here are some general guidelines for adults: 
· Less active women and older adults need 1,600 to 2,000 calories each day. · Active women and less active men need 2,000 to 2,400 calories each day. · Active men need 2,400 to 3,000 calories each day. Your Body mass index is 51.76 kg/(m^2). Wt Readings from Last 3 Encounters:  
06/26/17 292 lb 3.2 oz (132.5 kg) 06/13/17 290 lb (131.5 kg) 04/21/17 288 lb (130.6 kg) Health/Fitness/Weight Loss: 1. [employer] is a great free phone or online arlette to track your food intake and exercise. Download the ap today. Log EVERYTHING you Eat and DRINK for at least 3 days straight. Then evaluate easy changes you can make for healthier living and be consistent. Find 300 calories daily you can easily get rid. 300 less calories every day will lead to a half pound weight loss per week. It gets better, this leads to a 25 pound weight loss in 1 year! Wow, a little bit goes a long way. 1 pound = 3,500 calories 2. Doing CARDIO the first thing in the morning will burn the STORED or EXCESS FAT in your body, rather than the food just ate! This is called fasting cardio. The body uses the fat as energy and the fat just melts away!!! 45-60 minutes of vigorous exercise 6 days per week is an optimal maintinence goal. 
3. Drinking water REDUCES BELLY FAT! Drink 4 - 16 oz bottles 8 glasses/64 ounces of water daily. 4. During your CHALLENGE don't eat processed food! Processed foods are filled with artificial ingredients and sugars that your body DOES NOT need! 5. Have a meal plan. Know what you are going to eat for every meal so that there is no guessing and you don't resort to fast food. 6. Eat whole foods. Lean meats, fruits, veggies and nuts! In orderTO DROP WIEGHT YOU HAVE TO EAT!!!! 
 
 
YOUR PERSONAL GOALS: 
CHOOSE 1 Food Goal to start TODAY: ___________________________________ CHOOSE 1 Activity Goal to start TODAY: __________________________________ Make the smallest step you can and be consistent! :) You'll Love the Results. You are loved. You're [place] It! How can you cut calories and eat healthy meals? Whole grains, vegetables and fruits, and dried beans are good lower-calorie foods. They give you lots of nutrients and fiber. And they fill you up. Sweets, energy drinks, and soda pop are high in calories. They give you few nutrients and no fiber. Try to limit soda pop, fruit juice, and energy drinks. Drink water instead. Some fats can be part of a healthy diet. But cutting back on fats from highly processed foods like fast foods and many snack foods is a good way to lower the calories in your diet. Also, use smaller amounts of fats like butter, margarine, salad dressing, and mayonnaise. Add fresh garlic, lemon, or herbs to your meals to add flavor without adding fat. Meats and dairy products can be a big source of hidden fats. Try to choose lean or low-fat versions of these products. Fat-free cookies, candies, chips, and frozen treats can still be high in sugar and calories. Some fat-free foods have more calories than regular ones. Eat fat-free treats in moderation, as you would other foods. If your favorite foods are high in fat, salt, sugar, or calories, limit how often you eat them. Eat smaller servings, or look for healthy substitutes. Fill up on fruits, vegetables, and whole grains. Eating at home · Use meat as a side dish instead of as the main part of your meal. 
· Try main dishes that use whole wheat pasta, brown rice, dried beans, or vegetables. · Find ways to cook with little or no fat, such as broiling, steaming, or grilling. · Use cooking spray instead of oil. If you use oil, use a monounsaturated oil, such as canola or olive oil. · Trim fat from meats before you cook them. · Drain off fat after you brown the meat or while you roast it. · Chill soups and stews after you cook them. Then skim the fat off the top after it hardens. Eating out · Order foods that are broiled or poached rather than fried or breaded. · Cut back on the amount of butter or margarine that you use on bread. · Order sauces, gravies, and salad dressings on the side, and use only a little. · When you order pasta, choose tomato sauce rather than cream sauce. · Ask for salsa with your baked potato instead of sour cream, butter, cheese, or dawn. · Order meals in a small size instead of upgrading to a large. · Share an entree, or take part of your food home to eat as another meal. 
· Share appetizers and desserts. Where can you learn more? Go to http://ruben-frank.info/. Enter 99 [id number] in the search box to learn more about \"Learning About Cutting Calories. \" Current as of: November 9, 2016 Content Version: 11.3 © 9444-1048 Tzee. Care instructions adapted under license by [employer] (which disclaims liability or warranty for this information). If you have questions about a medical condition or this instruction, always ask your healthcare professional. [place] [address] any warranty or liability for your use of this information. Introducing [facility] & HEALTH SERVICES! Dear Beth Factor: 
Thank you for requesting a "[employer] account. Our records indicate that you already have an active "[employer] account. You can access your account anytime at [email or web address] Did you know that you can access your hospital and ER discharge instructions at any time in "[employer]? You can also review all of your test results from your hospital stay or ER visit. Additional Information If you have questions, please visit the Frequently Asked Questions section of the "[employer] website at [email or web address] Remember, "[employer] is NOT to be used for urgent needs. For medical emergencies, dial 911. Now available from your iPhone and Android! Please provide this summary of care documentation to your next provider. Your primary care clinician is listed as [employer]  If you have any questions after today's visit, please call [phone].

## 2017-06-26 NOTE — PROGRESS NOTES
Ms. Gala Koch is a 31y.o. year old female who had concerns including Medication Refill and Immunization/Injection. HPI:  Chief Complaint   Patient presents with    Medication Refill     Takes Metformin    Immunization/Injection     t Dap     PCOS- on metformin. First refill from our office. Requested. Weight obese    HTN- medication combo working well. medicaiton compliance    Body mass index is 51.76 kg/(m^2). pt interested in a weight loss proggram,? Gastric sleeve. Past Medical History:   Diagnosis Date    Asthma     Hypertension     Neurological disorder     Migraines     Current Outpatient Prescriptions   Medication Sig Dispense    SUMAtriptan (IMITREX) 100 mg tablet Take 100 mg by mouth once as needed for Migraine.  metFORMIN (GLUCOPHAGE) 500 mg tablet Take 1 Tab by mouth two (2) times daily (with meals). Indications: POLYCYSTIC OVARIAN SYNDROME 60 Tab    lisinopril-hydroCHLOROthiazide (PRINZIDE, ZESTORETIC) 20-25 mg per tablet Take 1 Tab by mouth daily. 90 Tab    topiramate (TOPAMAX) 50 mg tablet Take 1 Tab by mouth two (2) times a day. 60 Tab    methylPREDNISolone (MEDROL, STELLA,) 4 mg tablet As directed 1 Dose Pack    SUMAtriptan (IMITREX) 20 mg/actuation nasal spray 1 [place] by Both Nostrils route as needed for Migraine. 1 spray in each nostril x1;  may repeat dose x1 after 2h  Indications: MIGRAINE 1 Container    albuterol (PROVENTIL HFA, VENTOLIN HFA, PROAIR HFA) 90 mcg/actuation inhaler Take 2 Puffs by inhalation every four (4) hours as needed for Wheezing. 1 Inhaler    ergocalciferol (ERGOCALCIFEROL) 50,000 unit capsule Take 1 capsule daily x 7 days, then take 1 capsule weekly x 7 weeks. Indications: VITAMIN D DEFICIENCY (HIGH DOSE THERAPY) 14 Cap     No current facility-administered medications for this visit. Reviewed PmHx, RxHx, FmHx, SocHx, AllgHx and updated and dated in the chart.     ROS: Negative except for BOLD  General: fever, chills, fatigue  Respiratory: cough, SOB, wheezing  Cardiovascular:  CP, palpitation, MANZO, edema   Gastrointestinal: N/V/D, bleeding  Genito-Urinary: dysuria, hematuria  Musculoskeletal: muscle weakness, pain, swelling    OBJECTIVE:   Visit Vitals    /85 (BP 1 Location: Left arm, BP Patient Position: Sitting)    Pulse 72    Temp 98.2 °F (36.8 °C) (Oral)    Resp 17    Ht 5' 3\" (1.6 m)    Wt 292 lb 3.2 oz (132.5 kg)    SpO2 93%    BMI 51.76 kg/m2     GEN: The patient appears well, alert, oriented x 3, in no distress. ENT: bilateral TM and canal normal.  Neck supple. No adenopathy or thyromegaly. CAMDEN. Lungs: clear bilaterally, good air entry, no wheezes, rhonchi or rales. Cardiovascular: regular rate and rhythm. S1 and S2 normal, no murmurs,  Abdomen: + BS, soft without tenderness, guarding, rebound, mass or organomegaly. Extremities: no edema, normal peripheral pulses. Neurological: normal, gross sensory and motor in tact without focal findings. Assessment/ Plan:       ICD-10-CM ICD-9-CM    1. Essential hypertension with goal blood pressure less than 140/90 I10 401.9 lisinopril-hydroCHLOROthiazide (PRINZIDE, ZESTORETIC) 20-25 mg per tablet      REFERRAL TO BARIATRIC SURGERY   2. PCOS (polycystic ovarian syndrome) E28.2 256.4 metFORMIN (GLUCOPHAGE) 500 mg tablet      REFERRAL TO BARIATRIC SURGERY   3. BMI 50.0-59.9, adult ([facility]) Z68.43 V85.43 REFERRAL TO BARIATRIC SURGERY   4. Morbid obesity due to excess calories ([facility]) E66.01 278.01      Greater than 50% of this 25 minute visit was spent in direct counseling with patient for hx, discussing diagnosis, treatment plan, education and answering patients questions and concerns. I have discussed the diagnosis with the patient and the intended plan as seen in the above orders. The patient has received an after-visit summary and questions were answered concerning future plans. Medication Side Effects and Warnings were discussed with patient.     Follow-up Disposition:  Return in about 2 months (around 8/26/2017) for Weight Loss progress. Mar Chandler MD

## 2017-06-26 NOTE — PATIENT INSTRUCTIONS
Learning About Bariatric Surgery  What is bariatric surgery? Bariatric surgery is surgery to help you lose weight. This type of surgery is only used for people who are very overweight and have not been able to lose weight with diet and exercise. This surgery makes the stomach smaller. Some types of surgery also change the connection between your stomach and intestines. How is bariatric surgery done? Bariatric surgery may be either \"open\" or \"laparoscopic. \" Open surgery is done through a large cut (incision) in the belly. Laparoscopic surgery is done through several small cuts. The doctor puts a lighted tube, or scope, and other surgical tools through small cuts in your belly. The doctor is able to see your organs with the scope. There are different types of bariatric surgery. Gastric sleeve surgery  The surgery is usually done through several small incisions in the belly. The doctor removes more than half of your stomach. This leaves a thin sleeve, or tube, that is about the size of a banana. Because part of your stomach has been removed, this can't be reversed. Frank-en-Y gastric bypass surgery  Frank-en-Y (say \"macey-en-why\") surgery changes the connection between the stomach and the intestines. The doctor separates a section of your stomach from the rest of your stomach. This makes a small pouch. The new pouch will hold the food you eat. The doctor connects the stomach pouch to the middle part of the small intestine. Gastric banding surgery  The surgery is usually done through several small incisions in the belly. The doctor wraps a band around the upper part of the stomach. This creates a small pouch. The small size of the pouch means that you will get full after you eat just a small amount of food. The doctor can inflate or deflate the band to adjust the size. This lets the doctor adjust how quickly food passes from the new pouch into the stomach.  It does not change the connection between the stomach and the intestines. What can you expect after the surgery? You may stay in the hospital for one or more days after the surgery. How long you stay depends on the type of surgery you had. Most people need 2 to 4 weeks before they are ready to get back to their usual routine. For the first 2 to 6 weeks after surgery, you probably will need to follow a liquid or soft diet. Bit by bit, you will be able to eat more solid foods. Your doctor may advise you to work with a dietitian. This way you'll be sure to get enough protein, vitamins, and minerals while you are losing weight. Even with a healthy diet, you may need to take vitamin and mineral supplements. After surgery, you will not be able to eat very much at one time. You will get full quickly. Try not to eat too much at one time or eat foods that are high in fat or sugar. If you do, you may vomit, get stomach pain, or have diarrhea. You probably will lose weight very quickly in the first few months after surgery. As time goes on, your weight loss will slow down. You will have regular doctor visits to check how you are doing. Think of bariatric surgery as a tool to help you lose weight. It isn't an instant fix. You will still need to eat a healthy diet and get regular exercise. This will help you reach your weight goal and avoid regaining the weight you lose. Follow-up care is a key part of your treatment and safety. Be sure to make and go to all appointments, and call your doctor if you are having problems. It's also a good idea to know your test results and keep a list of the medicines you take. Where can you learn more? Go to http://ruben-frank.info/. Enter G469 in the search box to learn more about \"Learning About Bariatric Surgery. \"  Current as of: October 13, 2016  Content Version: 11.3  © 3915-2763 [employer].  Care instructions adapted under license by [employer] (which disclaims liability or warranty for this information). If you have questions about a medical condition or this instruction, always ask your healthcare professional. [address] any warranty or liability for your use of this information. Learning About Cutting Calories  How do calories affect your weight? Food gives your body energy. Energy from the food you eat is measured in calories. This energy keeps your heart beating, your brain active, and your muscles working. Your body needs a certain number of calories each day. After your body uses the calories it needs, it stores extra calories as fat. To lose weight safely, you have to eat fewer calories while eating in a healthy way. How many calories do you need each day? The more active you are, the more calories you need. When you are less active, you need fewer calories. How many calories you need each day also depends on several things, including your age and whether you are male or female. Here are some general guidelines for adults:  · Less active women and older adults need 1,600 to 2,000 calories each day. · Active women and less active men need 2,000 to 2,400 calories each day. · Active men need 2,400 to 3,000 calories each day. Your Body mass index is 51.76 kg/(m^2). Wt Readings from Last 3 Encounters:   06/26/17 292 lb 3.2 oz (132.5 kg)   06/13/17 290 lb (131.5 kg)   04/21/17 288 lb (130.6 kg)         Health/Fitness/Weight Loss: 1. [employer] is a great free phone or online arlette to track your food intake and exercise. Download the ap today. Log EVERYTHING you Eat and DRINK for at least 3 days straight. Then evaluate easy changes you can make for healthier living and be consistent. Find 300 calories daily you can easily get rid. 300 less calories every day will lead to a half pound weight loss per week. It gets better, this leads to a 25 pound weight loss in 1 year! Wow, a little bit goes a long way. 1 pound = 3,500 calories    2.  Doing CARDIO the first thing in the morning will burn the STORED or EXCESS FAT in your body, rather than the food just ate! This is called fasting cardio. The body uses the fat as energy and the fat just melts away!!! 45-60 minutes of vigorous exercise 6 days per week is an optimal maintinence goal.  3. Drinking water REDUCES BELLY FAT! Drink 4 - 16 oz bottles 8 glasses/64 ounces of water daily. 4. During your CHALLENGE don't eat processed food! Processed foods are filled with artificial ingredients and sugars that your body DOES NOT need! 5. Have a meal plan. Know what you are going to eat for every meal so that there is no guessing and you don't resort to fast food. 6. Eat whole foods. Lean meats, fruits, veggies and nuts! In orderTO DROP WIEGHT YOU HAVE TO EAT!!!!      YOUR PERSONAL GOALS:  CHOOSE 1 Food Goal to start TODAY: ___________________________________    CHOOSE 1 Activity Goal to start TODAY: __________________________________    Make the smallest step you can and be consistent! :) You'll Love the Results. You are loved. You're Warrior It! How can you cut calories and eat healthy meals? Whole grains, vegetables and fruits, and dried beans are good lower-calorie foods. They give you lots of nutrients and fiber. And they fill you up. Sweets, energy drinks, and soda pop are high in calories. They give you few nutrients and no fiber. Try to limit soda pop, fruit juice, and energy drinks. Drink water instead. Some fats can be part of a healthy diet. But cutting back on fats from highly processed foods like fast foods and many snack foods is a good way to lower the calories in your diet. Also, use smaller amounts of fats like butter, margarine, salad dressing, and mayonnaise. Add fresh garlic, lemon, or herbs to your meals to add flavor without adding fat. Meats and dairy products can be a big source of hidden fats. Try to choose lean or low-fat versions of these products.   Fat-free cookies, candies, chips, and frozen treats can still be high in sugar and calories. Some fat-free foods have more calories than regular ones. Eat fat-free treats in moderation, as you would other foods. If your favorite foods are high in fat, salt, sugar, or calories, limit how often you eat them. Eat smaller servings, or look for healthy substitutes. Fill up on fruits, vegetables, and whole grains. Eating at home  · Use meat as a side dish instead of as the main part of your meal.  · Try main dishes that use whole wheat pasta, brown rice, dried beans, or vegetables. · Find ways to cook with little or no fat, such as broiling, steaming, or grilling. · Use cooking spray instead of oil. If you use oil, use a monounsaturated oil, such as canola or olive oil. · Trim fat from meats before you cook them. · Drain off fat after you brown the meat or while you roast it. · Chill soups and stews after you cook them. Then skim the fat off the top after it hardens. Eating out  · Order foods that are broiled or poached rather than fried or breaded. · Cut back on the amount of butter or margarine that you use on bread. · Order sauces, gravies, and salad dressings on the side, and use only a little. · When you order pasta, choose tomato sauce rather than cream sauce. · Ask for salsa with your baked potato instead of sour cream, butter, cheese, or dawn. · Order meals in a small size instead of upgrading to a large. · Share an entree, or take part of your food home to eat as another meal.  · Share appetizers and desserts. Where can you learn more? Go to http://ruben-frank.info/. Enter 99 [id number] in the search box to learn more about \"Learning About Cutting Calories. \"  Current as of: November 9, 2016  Content Version: 11.3  © 5670-4373 [employer]. Care instructions adapted under license by [employer] (which disclaims liability or warranty for this information).  If you have questions about a medical condition or this instruction, always ask your healthcare professional. [address] any warranty or liability for your use of this information. Vaccine Information Statement     Tdap (Tetanus, Diphtheria, Pertussis) Vaccine: What You Need to Know    Many Vaccine Information Statements are available in Mongolian and other languages. See www.immunize.org/vis. Hojas de Información Sobre Vacunas están disponibles en español y en muchos otros idiomas. Visite [employer].si    1. Why get vaccinated? Tetanus, diphtheria, and pertussis are very serious diseases. Tdap vaccine can protect us from these diseases. And, Tdap vaccine given to pregnant women can protect  babies against pertussis. TETANUS (Lockjaw) is rare in the [facility] today. It causes painful muscle tightening and stiffness, usually all over the body.  It can lead to tightening of muscles in the head and neck so you cant open your mouth, swallow, or sometimes even breathe. Tetanus kills about 1 out of 10 people who are infected even after receiving the best medical care. DIPHTHERIA is also rare in the [facility] today. It can cause a thick coating to form in the back of the throat.  It can lead to breathing problems, heart failure, paralysis, and death. PERTUSSIS (Whooping Cough) causes severe coughing spells, which can cause difficulty breathing, vomiting, and disturbed sleep.  It can also lead to weight loss, incontinence, and rib fractures. Up to 2 in 100 adolescents and 5 in 100 adults with pertussis are hospitalized or have complications, which could include pneumonia or death. These diseases are caused by bacteria. Diphtheria and pertussis are spread from person to person through secretions from coughing or sneezing. Tetanus enters the body through cuts, scratches, or wounds.     Before vaccines, as many as 200,000 cases of diphtheria, 200,000 cases of pertussis, and hundreds of cases of tetanus, were reported in the [place] each year. Since vaccination began, reports of cases for tetanus and diphtheria have dropped by about 99% and for pertussis by about 80%. 2. Tdap vaccine    Tdap vaccine can protect adolescents and adults from tetanus, diphtheria, and pertussis. One dose of Tdap is routinely given at age 6 or 15. People who did not get Tdap at that age should get it as soon as possible. Tdap is especially important for health care professionals and anyone having close contact with a baby younger than 12 months. Pregnant women should get a dose of Tdap during every pregnancy, to protect the  from pertussis. Infants are most at risk for severe, life-threatening complications from pertussis. Another vaccine, called Td, protects against tetanus and diphtheria, but not pertussis. A Td booster should be given every 10 years. Tdap may be given as one of these boosters if you have never gotten Tdap before. Tdap may also be given after a severe cut or burn to prevent tetanus infection. Your doctor or the person giving you the vaccine can give you more information. Tdap may safely be given at the same time as other vaccines. 3. Some people should not get this vaccine     A person who has ever had a life-threatening allergic reaction after a previous dose of any diphtheria, tetanus or pertussis containing vaccine, OR has a severe allergy to any part of this vaccine, should not get Tdap vaccine. Tell the person giving the vaccine about any severe allergies.  Anyone who had coma or long repeated seizures within 7 days after a childhood dose of DTP or DTaP, or a previous dose of Tdap, should not get Tdap, unless a cause other than the vaccine was found. They can still get Td.      Talk to your doctor if you:  - have seizures or another nervous system problem,  - had severe pain or swelling after any vaccine containing diphtheria, tetanus or pertussis, - ever had a condition called Guillain Barré Syndrome (GBS),  - arent feeling well on the day the shot is scheduled. 4. Risks    With any medicine, including vaccines, there is a chance of side effects. These are usually mild and go away on their own. Serious reactions are also possible but are rare. Most people who get Tdap vaccine do not have any problems with it. Mild Problems following Tdap  (Did not interfere with activities)   Pain where the shot was given (about 3 in 4 adolescents or 2 in 3 adults)   Redness or swelling where the shot was given (about 1 person in 5)   Mild fever of at least 100.4°F (up to about 1 in 25 adolescents or 1 in 100 adults)   Headache (about 3 or 4 people in 10)   Tiredness (about 1 person in 3 or 4)   Nausea, vomiting, diarrhea, stomach ache (up to 1 in 4 adolescents or 1 in 10 adults)   Chills,  sore joints (about 1 person in 10)   Body aches (about 1 person in 3 or 4)    Rash, swollen glands (uncommon)    Moderate Problems following Tdap  (Interfered with activities, but did not require medical attention)   Pain where the shot was given (up to 1 in 5 or 6)    Redness or swelling where the shot was given (up to about 1 in 16 adolescents or 1 in 12 adults)   Fever over 102°F (about 1 in 100 adolescents or 1 in 250 adults)   Headache (about 1 in 7 adolescents or 1 in 10 adults)   Nausea, vomiting, diarrhea, stomach ache (up to 1 or 3 people in 100)   Swelling of the entire arm where the shot was given (up to about 1 in 500). Severe Problems following Tdap  (Unable to perform usual activities; required medical attention)   Swelling, severe pain, bleeding, and redness in the arm where the shot was given (rare). Problems that could happen after any vaccine:     People sometimes faint after a medical procedure, including vaccination. Sitting or lying down for about 15 minutes can help prevent fainting, and injuries caused by a fall.  Tell your doctor if you feel dizzy, or have vision changes or ringing in the ears.  Some people get severe pain in the shoulder and have difficulty moving the arm where a shot was given. This happens very rarely.  Any medication can cause a severe allergic reaction. Such reactions from a vaccine are very rare, estimated at fewer than 1 in a million doses, and would happen within a few minutes to a few hours after the vaccination. As with any medicine, there is a very remote chance of a vaccine causing a serious injury or death. The safety of vaccines is always being monitored. For more information, visit: www.cdc.gov/vaccinesafety/    5. What if there is a serious problem? What should I look for?  Look for anything that concerns you, such as signs of a severe allergic reaction, very high fever, or unusual behavior.  Signs of a severe allergic reaction can include hives, swelling of the face and throat, difficulty breathing, a fast heartbeat, dizziness, and weakness. These would usually start a few minutes to a few hours after the vaccination. What should I do?  If you think it is a severe allergic reaction or other emergency that cant wait, call 9-1-1 or get the person to the nearest hospital. Otherwise, call your doctor.  Afterward, the reaction should be reported to the Vaccine Adverse Event Reporting System (VAERS). Your doctor might file this report, or you can do it yourself through the VAERS web site at www.vaers. hhs.gov, or by calling 9-140.119.2756. VAERS does not give medical advice. 6. The National Vaccine Injury Compensation Program    The [facility] Vaccine Injury Compensation Program (VICP) is a federal program that was created to compensate people who may have been injured by certain vaccines.     Persons who believe they may have been injured by a vaccine can learn about the program and about filing a claim by calling 3-606.779.4489 or visiting the [employer] website at www.hrsa.gov/vaccinecompensation. There is a time limit to file a claim for compensation. 7. How can I learn more?  Ask your doctor. He or she can give you the vaccine package insert or suggest other sources of information.  Call your local or state health department.  Contact the Centers for Disease Control and Prevention (CDC):  - Call 5-443.866.8946 (1-800-CDC-INFO) or  - Visit CDCs website at www.cdc.gov/vaccines      Vaccine Information Statement   Tdap Vaccine  (2/24/2015)  [address] [id number]    Department of Health and Human Services  Centers for Disease Control and Prevention    Office Use Only

## 2017-06-27 ENCOUNTER — ANESTHESIA EVENT (OUTPATIENT)
Dept: ENDOSCOPY | Age: 27
End: 2017-06-27
Payer: COMMERCIAL

## 2017-06-27 ENCOUNTER — ANESTHESIA (OUTPATIENT)
Dept: ENDOSCOPY | Age: 27
End: 2017-06-27
Payer: COMMERCIAL

## 2017-06-27 ENCOUNTER — HOSPITAL ENCOUNTER (OUTPATIENT)
Age: 27
Setting detail: OUTPATIENT SURGERY
Discharge: HOME | End: 2017-06-27
Attending: INTERNAL MEDICINE | Admitting: INTERNAL MEDICINE
Payer: COMMERCIAL

## 2017-06-27 VITALS
RESPIRATION RATE: 16 BRPM | SYSTOLIC BLOOD PRESSURE: 141 MMHG | WEIGHT: 292 LBS | OXYGEN SATURATION: 96 % | TEMPERATURE: 98 F | DIASTOLIC BLOOD PRESSURE: 83 MMHG | HEART RATE: 78 BPM | BODY MASS INDEX: 51.73 KG/M2

## 2017-06-27 LAB — HCG UR QL: NEGATIVE

## 2017-06-27 PROCEDURE — 76060000031 HC ANESTHESIA FIRST 0.5 HR: Performed by: SPECIALIST

## 2017-06-27 PROCEDURE — 74011000250 HC RX REV CODE- 250

## 2017-06-27 PROCEDURE — 81025 URINE PREGNANCY TEST: CPT

## 2017-06-27 PROCEDURE — 77030027957 HC TBNG IRR ENDOGTR BUSS -B: Performed by: SPECIALIST

## 2017-06-27 PROCEDURE — 76040000019: Performed by: SPECIALIST

## 2017-06-27 PROCEDURE — 74011250636 HC RX REV CODE- 250/636

## 2017-06-27 RX ORDER — SODIUM CHLORIDE 9 MG/ML
INJECTION, SOLUTION INTRAVENOUS
Status: DISCONTINUED | OUTPATIENT
Start: 2017-06-27 | End: 2017-06-27 | Stop reason: HOSPADM

## 2017-06-27 RX ORDER — FLUMAZENIL 0.1 MG/ML
0.2 INJECTION INTRAVENOUS
Status: DISCONTINUED | OUTPATIENT
Start: 2017-06-27 | End: 2017-06-27 | Stop reason: HOSPADM

## 2017-06-27 RX ORDER — FENTANYL CITRATE 50 UG/ML
200 INJECTION, SOLUTION INTRAMUSCULAR; INTRAVENOUS
Status: DISCONTINUED | OUTPATIENT
Start: 2017-06-27 | End: 2017-06-27 | Stop reason: HOSPADM

## 2017-06-27 RX ORDER — EPINEPHRINE 0.1 MG/ML
1 INJECTION INTRACARDIAC; INTRAVENOUS
Status: DISCONTINUED | OUTPATIENT
Start: 2017-06-27 | End: 2017-06-27 | Stop reason: HOSPADM

## 2017-06-27 RX ORDER — NALOXONE HYDROCHLORIDE 0.4 MG/ML
0.4 INJECTION, SOLUTION INTRAMUSCULAR; INTRAVENOUS; SUBCUTANEOUS
Status: DISCONTINUED | OUTPATIENT
Start: 2017-06-27 | End: 2017-06-27 | Stop reason: HOSPADM

## 2017-06-27 RX ORDER — SODIUM CHLORIDE 0.9 % (FLUSH) 0.9 %
5-10 SYRINGE (ML) INJECTION EVERY 8 HOURS
Status: DISCONTINUED | OUTPATIENT
Start: 2017-06-27 | End: 2017-06-27 | Stop reason: HOSPADM

## 2017-06-27 RX ORDER — ATROPINE SULFATE 0.1 MG/ML
0.5 INJECTION INTRAVENOUS
Status: DISCONTINUED | OUTPATIENT
Start: 2017-06-27 | End: 2017-06-27 | Stop reason: HOSPADM

## 2017-06-27 RX ORDER — SODIUM CHLORIDE 9 MG/ML
50 INJECTION, SOLUTION INTRAVENOUS CONTINUOUS
Status: DISCONTINUED | OUTPATIENT
Start: 2017-06-27 | End: 2017-06-27 | Stop reason: HOSPADM

## 2017-06-27 RX ORDER — PROPOFOL 10 MG/ML
INJECTION, EMULSION INTRAVENOUS AS NEEDED
Status: DISCONTINUED | OUTPATIENT
Start: 2017-06-27 | End: 2017-06-27 | Stop reason: HOSPADM

## 2017-06-27 RX ORDER — LIDOCAINE HYDROCHLORIDE 20 MG/ML
INJECTION, SOLUTION EPIDURAL; INFILTRATION; INTRACAUDAL; PERINEURAL AS NEEDED
Status: DISCONTINUED | OUTPATIENT
Start: 2017-06-27 | End: 2017-06-27 | Stop reason: HOSPADM

## 2017-06-27 RX ORDER — MIDAZOLAM HYDROCHLORIDE 1 MG/ML
.25-1 INJECTION, SOLUTION INTRAMUSCULAR; INTRAVENOUS
Status: DISCONTINUED | OUTPATIENT
Start: 2017-06-27 | End: 2017-06-27 | Stop reason: HOSPADM

## 2017-06-27 RX ORDER — SODIUM CHLORIDE 0.9 % (FLUSH) 0.9 %
5-10 SYRINGE (ML) INJECTION AS NEEDED
Status: DISCONTINUED | OUTPATIENT
Start: 2017-06-27 | End: 2017-06-27 | Stop reason: HOSPADM

## 2017-06-27 RX ORDER — DEXTROMETHORPHAN/PSEUDOEPHED 2.5-7.5/.8
1.2 DROPS ORAL
Status: DISCONTINUED | OUTPATIENT
Start: 2017-06-27 | End: 2017-06-27 | Stop reason: HOSPADM

## 2017-06-27 RX ADMIN — PROPOFOL 20 MG: 10 INJECTION, EMULSION INTRAVENOUS at 12:04

## 2017-06-27 RX ADMIN — PROPOFOL 20 MG: 10 INJECTION, EMULSION INTRAVENOUS at 12:10

## 2017-06-27 RX ADMIN — PROPOFOL 20 MG: 10 INJECTION, EMULSION INTRAVENOUS at 12:00

## 2017-06-27 RX ADMIN — PROPOFOL 20 MG: 10 INJECTION, EMULSION INTRAVENOUS at 12:01

## 2017-06-27 RX ADMIN — PROPOFOL 110 MG: 10 INJECTION, EMULSION INTRAVENOUS at 11:54

## 2017-06-27 RX ADMIN — PROPOFOL 20 MG: 10 INJECTION, EMULSION INTRAVENOUS at 12:06

## 2017-06-27 RX ADMIN — PROPOFOL 20 MG: 10 INJECTION, EMULSION INTRAVENOUS at 12:03

## 2017-06-27 RX ADMIN — LIDOCAINE HYDROCHLORIDE 40 MG: 20 INJECTION, SOLUTION EPIDURAL; INFILTRATION; INTRACAUDAL; PERINEURAL at 11:54

## 2017-06-27 RX ADMIN — PROPOFOL 20 MG: 10 INJECTION, EMULSION INTRAVENOUS at 12:08

## 2017-06-27 RX ADMIN — SODIUM CHLORIDE: 9 INJECTION, SOLUTION INTRAVENOUS at 11:46

## 2017-06-27 RX ADMIN — PROPOFOL 20 MG: 10 INJECTION, EMULSION INTRAVENOUS at 12:02

## 2017-06-27 RX ADMIN — PROPOFOL 20 MG: 10 INJECTION, EMULSION INTRAVENOUS at 12:13

## 2017-06-27 RX ADMIN — PROPOFOL 20 MG: 10 INJECTION, EMULSION INTRAVENOUS at 11:58

## 2017-06-27 NOTE — IP AVS SNAPSHOT
[address] 
[phone] Patient: Junie De León MRN: [id number] :1990 You are allergic to the following No active allergies Recent Documentation Weight BMI OB Status Smoking Status  
  
  
 132.5 kg 51.73 kg/m2 Unknown Never Smoker Emergency Contacts Name Discharge Info Relation Home Work Mobile BERENICE JAMAD DISCHARGE CAREGIVER [3] Parent [1] [phone] SolaresElbert de la vega  Other Relative [6] [phone] About your hospitalization You were admitted on:  2017 You last received care in the:  [facility] ENDOSCOPY You were discharged on:  2017 Unit phone number:  [phone] Why you were hospitalized Your primary diagnosis was:  Not on File Providers Seen During Your Hospitalizations Provider Role Specialty Primary office phone Kimmy Beal MD Attending Provider Gastroenterology [phone] Your Primary Care Physician (PCP) Primary Care Physician Office Phone Office Fax [employer] [phone] Follow-up Information None Current Discharge Medication List  
  
CONTINUE these medications which have NOT CHANGED Dose & Instructions Dispensing Information Comments Morning Noon Evening Bedtime  
 albuterol 90 mcg/actuation inhaler Commonly known as:  PROVENTIL HFA, VENTOLIN HFA, PROAIR HFA Your last dose was: Your next dose is:    
   
   
 Dose:  2 Puff Take 2 Puffs by inhalation every four (4) hours as needed for Wheezing. Quantity:  1 Inhaler Refills:  12  
     
   
   
   
  
 ergocalciferol 50,000 unit capsule Commonly known as:  ERGOCALCIFEROL Your last dose was: Your next dose is: Take 1 capsule daily x 7 days, then take 1 capsule weekly x 7 weeks. Indications: VITAMIN D DEFICIENCY (HIGH DOSE THERAPY) Quantity:  14 Cap Refills:  0 IMITREX 100 mg tablet Generic drug:  SUMAtriptan Your last dose was: Your next dose is:    
   
   
 Dose:  100 mg Take 100 mg by mouth once as needed for Migraine. Refills:  0  
     
   
   
   
  
 lisinopril-hydroCHLOROthiazide 20-25 mg per tablet Commonly known as:  Raymona Began Your last dose was: Your next dose is:    
   
   
 Dose:  1 Tab Take 1 Tab by mouth daily. Quantity:  90 Tab Refills:  4  
     
   
   
   
  
 metFORMIN 500 mg tablet Commonly known as:  GLUCOPHAGE Your last dose was: Your next dose is:    
   
   
 Dose:  500 mg Take 1 Tab by mouth two (2) times daily (with meals). Indications: POLYCYSTIC OVARIAN SYNDROME Quantity:  60 Tab Refills:  5  
     
   
   
   
  
 methylPREDNISolone 4 mg tablet Commonly known as:  MEDROL (STELLA) Your last dose was: Your next dose is: As directed Quantity:  1 Dose Pack Refills:  0 SUMAtriptan 20 mg/actuation nasal spray Commonly known as:  IMITREX Your last dose was: Your next dose is:    
   
   
 Dose:  1 Spray 1 [place] by Both Nostrils route as needed for Migraine. 1 spray in each nostril x1;  may repeat dose x1 after 2h  Indications: MIGRAINE Quantity:  1 Container Refills:  4  
     
   
   
   
  
 topiramate 50 mg tablet Commonly known as:  TOPAMAX Your last dose was: Your next dose is:    
   
   
 Dose:  50 mg Take 1 Tab by mouth two (2) times a day. Quantity:  60 Tab Refills:  2 Discharge Instructions Sveta Acosta 
[id number] 
1990 COLON DISCHARGE INSTRUCTIONS Discomfort: 
Redness at IV site- apply warm compress to area; if redness or soreness persist- contact your physician There may be a slight amount of blood passed from the rectum Gaseous discomfort- walking, belching will help relieve any discomfort You may not operate a vehicle for 12 hours You may not engage in an occupation involving machinery or appliances for rest of today You may not drink alcoholic beverages for at least 12 hours Avoid making any critical decisions for at least 24 hour DIET: 
 High fiber diet.  however -  remember your colon is empty and a heavy meal will produce gas. Avoid these foods:  vegetables, fried / greasy foods, carbonated drinks for today. MEDICATIONS: 
  
 Regarding Aspirin or Nonsteroidal medications, please see below. ACTIVITY: 
You may resume your normal daily activities it is recommended that you spend the remainder of the day resting -  avoid any strenuous activity. CALL M.D. ANY SIGN OF: Increasing pain, nausea, vomiting Abdominal distension (swelling) New increased bleeding (oral or rectal) Fever (chills) Pain in chest area Bloody discharge from nose or mouth Shortness of breath You may not  take any Advil, Aspirin, Ibuprofen, Motrin, Aleve, or Goodys for 10 days, ONLY  Tylenol as needed for pain. Follow-up Instructions: 
 Call Dr. Severino Hannon Results of procedure / biopsy in 10 days Telephone #  [phone] DISCHARGE SUMMARY from Nurse The following personal items collected during your admission are returned to you:  
Dental Appliance: Dental Appliances: None Vision: Visual Aid: Glasses Hearing Aid:   
Jewelry:   
Clothing:   
Other Valuables:   
Valuables sent to safe:   
 
 
 
 
Discharge Orders None Introducing [facility]! Dear Jeet Reilly: 
Thank you for requesting a [employer] account. Our records indicate that you already have an active Pathful account. You can access your account anytime at [email or web address] Did you know that you can access your hospital and ER discharge instructions at any time in Pathful?   You can also review all of your test results from your hospital stay or ER visit. Additional Information If you have questions, please visit the Frequently Asked Questions section of the [employer] website at [email or web address] Remember, MyChart is NOT to be used for urgent needs. For medical emergencies, dial 911. Now available from your iPhone and Android! General Information Please provide this summary of care documentation to your next provider. Patient Signature:  ____________________________________________________________ Date:  ____________________________________________________________  
  
Les Crowley Provider Signature:  ____________________________________________________________ Date:  ____________________________________________________________

## 2017-06-27 NOTE — ANESTHESIA POSTPROCEDURE EVALUATION
Post-Anesthesia Evaluation and Assessment    Patient: Shahnaz Clement MRN: [id number]  SSN: [id number]    YOB: 1990  Age: 31 y.o. Sex: female       Cardiovascular Function/Vital Signs  Visit Vitals    /71    Pulse 77    Temp 36.7 °C (98 °F)    Resp 17    Wt 132.5 kg (292 lb)    SpO2 95%    BMI 51.73 kg/m2       Patient is status post MAC anesthesia for Procedure(s):  COLONOSCOPY. Nausea/Vomiting: None    Postoperative hydration reviewed and adequate. Pain:  Pain Scale 1: Visual (06/27/17 1220)  Pain Intensity 1: 0 (06/27/17 1220)   Managed    Neurological Status: At baseline    Mental Status and Level of Consciousness: Arousable    Pulmonary Status:   O2 Device: Room air (06/27/17 1220)   Adequate oxygenation and airway patent    Complications related to anesthesia: None    Post-anesthesia assessment completed.  No concerns    Signed By: Uli Galaviz MD     June 27, 2017

## 2017-06-27 NOTE — H&P
Colonoscopy History and Physical      The patient was seen and examined. Date of last colonoscopy: none, Polyps  No      The airway was assessed and documented. The problem list, past medical history, and medications were reviewed. Patient Active Problem List   Diagnosis Code    PCOS (polycystic ovarian syndrome) E28.2     Social History     Social History    Marital status: SINGLE     Spouse name: N/A    Number of children: N/A    Years of education: N/A     Occupational History    Not on file. Social History Main Topics    Smoking status: Never Smoker    Smokeless tobacco: Never Used    Alcohol use No    Drug use: No    Sexual activity: Yes     Partners: Male     Birth control/ protection: None     Other Topics Concern    Not on file     Social History Narrative     Past Medical History:   Diagnosis Date    Asthma     Hypertension     Ill-defined condition     cellulitis, KIMMY    Neurological disorder     Migraines    Sleep apnea          Prior to Admission Medications   Prescriptions Last Dose Informant Patient Reported? Taking? SUMAtriptan (IMITREX) 100 mg tablet Unknown at Unknown time  Yes No   Sig: Take 100 mg by mouth once as needed for Migraine. SUMAtriptan (IMITREX) 20 mg/actuation nasal spray Unknown at Unknown time  No No   [place] by Both Nostrils route as needed for Migraine. 1 spray in each nostril x1;  may repeat dose x1 after 2h  Indications: MIGRAINE   albuterol (PROVENTIL HFA, VENTOLIN HFA, PROAIR HFA) 90 mcg/actuation inhaler 2016  No No   Sig: Take 2 Puffs by inhalation every four (4) hours as needed for Wheezing.   ergocalciferol (ERGOCALCIFEROL) 50,000 unit capsule Not Taking at Unknown time  No No   Sig: Take 1 capsule daily x 7 days, then take 1 capsule weekly x 7 weeks.   Indications: VITAMIN D DEFICIENCY (HIGH DOSE THERAPY)   lisinopril-hydroCHLOROthiazide (PRINZIDE, ZESTORETIC) 20-25 mg per tablet 2017 at Unknown time  No Yes   Sig: Take 1 Tab by mouth daily. metFORMIN (GLUCOPHAGE) 500 mg tablet 4/27/2017  No No   Sig: Take 1 Tab by mouth two (2) times daily (with meals). Indications: POLYCYSTIC OVARIAN SYNDROME   methylPREDNISolone (MEDROL, STELLA,) 4 mg tablet Not Taking at Unknown time  No No   Sig: As directed   topiramate (TOPAMAX) 50 mg tablet 4/26/2017  No No   Sig: Take 1 Tab by mouth two (2) times a day. Facility-Administered Medications: None       The patient was seen and examined in the endoscopy suite. The airway was assessed and docuemented. The problem list and medications were reviewed. Chief complaint, history of present illness, and review of systems and Past medical History are positive for: Abdominal pain, hematochezia and HTN. The heart, lungs and mental status were satisfactory for the administration of sedation and for the procedure. I discussed with the patient the objectives, risks, consequences and alternatives to the procedure.      Plan: Endoscopic procedure with sedation     Lawrence Lal MD   6/27/2017  11:53 AM

## 2017-06-27 NOTE — ROUTINE PROCESS
Rona Doug  1990  [id number]    Situation:  Verbal report received from: Marj Guerra RN  Procedure: Procedure(s):  COLONOSCOPY    Background:    Preoperative diagnosis: ABDOMINAL PAIN, HYPERTENSION, HEMATOCHEZIA  Postoperative diagnosis: Normal    :  Dr. Jihan Ariza  Assistant(s): Endoscopy Technician-1: Zeb Ch  Endoscopy RN-1: Joanna Salmeron RN    Specimens: * No specimens in log *  H. Pylori  no    Assessment:  Intra-procedure medications           Anesthesia gave intra-procedure sedation and medications, see anesthesia flow sheet yes    Intravenous fluids: NS@ KVO     Vital signs stable     Abdominal assessment: round and soft and obese    Recommendation:  Discharge patient per MD order  Family or Friend  Pt sister-in law  Permission to share finding with family or friend yes

## 2017-06-27 NOTE — PROCEDURES
[address] [facility], [address]                 Colonoscopy Procedure Note    Indications:   See Preoperative Diagnosis above  Referring Physician: Ang Navarrete MD  Anesthesia/Sedation: MAC anesthesia Propofol  Endoscopist:  Dr. Jenna Ramires  Assistant:  Endoscopy Technician-1: Jacob Martinez  Endoscopy RN-1: Julia Be RN  Preoperative diagnosis: ABDOMINAL PAIN, HYPERTENSION, HEMATOCHEZIA  Postoperative diagnosis: Normal    Procedure in Detail:  Informed consent was obtained for the procedure, including sedation. Risks of perforation, hemorrhage, adverse drug reaction, and aspiration were discussed. The patient was placed in the left lateral decubitus position. Based on the pre-procedure assessment, including review of the patient's medical history, medications, allergies, and review of systems, she had been deemed to be an appropriate candidate for moderate sedation; she was therefore sedated with the medications listed above. The patient was monitored continuously with ECG tracing, pulse oximetry, blood pressure monitoring, and direct observations. A rectal examination was performed. The [id number] was inserted into the rectum and advanced under direct vision to the cecum, which was identified by the ileocecal valve and appendiceal orifice. The quality of the colonic preparation was good. A careful inspection was made as the colonoscope was withdrawn, including a retroflexed view of the rectum; findings and interventions are described below. Appropriate photodocumentation was obtained. Findings:  Rectum: normal  Sigmoid: normal  Descending Colon: normal  Transverse Colon: normal  Ascending Colon: normal  Cecum: normal    Specimens:    none    EBL: None    Complications: None; patient tolerated the procedure well. Recommendations:     - High fiber diet.     Signed By: Jenna Ramires MD                        June 27, 2017

## 2017-06-27 NOTE — PERIOP NOTES

## 2017-06-27 NOTE — DISCHARGE INSTRUCTIONS
Kaitlin Rasmussen  [id number]  1990    COLON DISCHARGE INSTRUCTIONS  Discomfort:  Redness at IV site- apply warm compress to area; if redness or soreness persist- contact your physician  There may be a slight amount of blood passed from the rectum  Gaseous discomfort- walking, belching will help relieve any discomfort  You may not operate a vehicle for 12 hours  You may not engage in an occupation involving machinery or appliances for rest of today  You may not drink alcoholic beverages for at least 12 hours  Avoid making any critical decisions for at least 24 hour  DIET:   High fiber diet. - however -  remember your colon is empty and a heavy meal will produce gas. Avoid these foods:  vegetables, fried / greasy foods, carbonated drinks for today. MEDICATIONS:      Regarding Aspirin or Nonsteroidal medications, please see below. ACTIVITY:  You may resume your normal daily activities it is recommended that you spend the remainder of the day resting -  avoid any strenuous activity. CALL M.D. ANY SIGN OF:  Increasing pain, nausea, vomiting  Abdominal distension (swelling)  New increased bleeding (oral or rectal)  Fever (chills)  Pain in chest area  Bloody discharge from nose or mouth  Shortness of breath    You may not  take any Advil, Aspirin, Ibuprofen, Motrin, Aleve, or Goodys for 10 days, ONLY  Tylenol as needed for pain.       Follow-up Instructions:   Call Dr. Shalom Lang  Results of procedure / biopsy in 10 days  Telephone #  [phone]      DISCHARGE SUMMARY from Nurse    The following personal items collected during your admission are returned to you:   Dental Appliance: Dental Appliances: None  Vision: Visual Aid: Glasses  Hearing Aid:    Jewelry:    Clothing:    Other Valuables:    Valuables sent to safe:

## 2017-06-27 NOTE — ANESTHESIA PREPROCEDURE EVALUATION
Anesthetic History   No history of anesthetic complications            Review of Systems / Medical History  Patient summary reviewed, nursing notes reviewed and pertinent labs reviewed    Pulmonary  Within defined limits                 Neuro/Psych   Within defined limits           Cardiovascular  Within defined limits  Hypertension: well controlled                   GI/Hepatic/Renal  Within defined limits              Endo/Other  Within defined limits           Other Findings              Physical Exam    Airway  Mallampati: I  TM Distance: > 6 cm  Neck ROM: normal range of motion   Mouth opening: Normal     Cardiovascular  Regular rate and rhythm,  S1 and S2 normal,  no murmur, click, rub, or gallop             Dental  No notable dental hx       Pulmonary  Breath sounds clear to auscultation               Abdominal  GI exam deferred       Other Findings            Anesthetic Plan    ASA: 2  Anesthesia type: MAC          Induction: Intravenous  Anesthetic plan and risks discussed with: Patient

## 2017-09-21 ENCOUNTER — OFFICE VISIT (OUTPATIENT)
Dept: INTERNAL MEDICINE CLINIC | Age: 27
End: 2017-09-21

## 2017-09-21 VITALS
RESPIRATION RATE: 18 BRPM | BODY MASS INDEX: 51.45 KG/M2 | HEIGHT: 63 IN | HEART RATE: 68 BPM | WEIGHT: 290.4 LBS | DIASTOLIC BLOOD PRESSURE: 86 MMHG | OXYGEN SATURATION: 96 % | SYSTOLIC BLOOD PRESSURE: 133 MMHG | TEMPERATURE: 98.4 F

## 2017-09-21 DIAGNOSIS — E28.2 PCOS (POLYCYSTIC OVARIAN SYNDROME): ICD-10-CM

## 2017-09-21 DIAGNOSIS — Z00.00 ANNUAL PHYSICAL EXAM: Primary | ICD-10-CM

## 2017-09-21 DIAGNOSIS — Z23 ENCOUNTER FOR IMMUNIZATION: ICD-10-CM

## 2017-09-21 DIAGNOSIS — E55.9 VITAMIN D DEFICIENCY: ICD-10-CM

## 2017-09-21 DIAGNOSIS — Z13.220 SCREENING FOR HYPERLIPIDEMIA: ICD-10-CM

## 2017-09-21 DIAGNOSIS — R60.9 NON-PITTING EDEMA: ICD-10-CM

## 2017-09-21 NOTE — PATIENT INSTRUCTIONS
Vaccine Information Statement    Influenza (Flu) Vaccine (Live, Intranasal): What you need to know    Many Vaccine Information Statements are available in Welsh and other languages. See www.immunize.org/vis  Hojas de Información Sobre Vacunas están disponibles en Español y en muchos otros idiomas. Visite www.immunize.org/vis    1. Why get vaccinated? Influenza (flu) is a contagious disease that spreads around the [place] every year, usually between October and May. Flu is caused by influenza viruses, and is spread mainly by coughing, sneezing, and close contact. Anyone can get flu. Flu strikes suddenly and can last several days. Symptoms vary by age, but can include:   fever/chills   sore throat   muscle aches   fatigue   cough   headache    runny or stuffy nose    Flu can also lead to pneumonia and blood infections, and cause diarrhea and seizures in children. If you have a medical condition, such as heart or lung disease, flu can make it worse. Flu is more dangerous for some people. Infants and young children, people 72years of age and older, pregnant women, and people with certain health conditions or a weakened immune system are at greatest risk. Each year thousands of people in the [facility] die from flu, and many more are hospitalized. Flu vaccine can:   keep you from getting flu,   make flu less severe if you do get it, and   keep you from spreading flu to your family and other people. 2. Live, attenuated flu vaccine  LAIV, Nasal [place]    A dose of flu vaccine is recommended every flu season. Children younger than 5years of age may need two doses during the same flu season. Everyone else needs only one dose each flu season. The live, attenuated influenza vaccine (called LAIV) may be given to healthy, non-pregnant people 2 through 52years of age. It may safely be given at the same time as other vaccines. LAIV is sprayed into the nose.  LAIV does not contain thimerosal or other preservatives. It is made from weakened flu virus and does not cause flu. There are many flu viruses, and they are always changing. Each year LAIV is made to protect against four viruses that are likely to cause disease in the upcoming flu season. But even when the vaccine doesnt exactly match these viruses, it may still provide some protection. Flu vaccine cannot prevent:   flu that is caused by a virus not covered by the vaccine, or   illnesses that look like flu but are not. It takes about 2 weeks for protection to develop after vaccination, and protection lasts through the flu season. 3. Some people should not get this vaccine    Some people should not get LAIV because of age, health conditions, or other reasons. Most of these people should get an injected flu vaccine instead. Your healthcare provider can help you decide. Tell the provider if you or the person being vaccinated:   have any allergies, including an allergy to eggs, or have ever had an allergic reaction to an influenza vaccine.  have ever had Guillain-Barré Syndrome (also called GBS).  have any long-term heart, breathing, kidney, liver, or nervous system problems.  have asthma or breathing problems, or are a child who has had wheezing episodes.  are pregnant.  are a child or adolescent who is receiving aspirin or aspirin-containing products.  have a weakened immune system.  will be visiting or taking care of someone, within the next 7 days, who requires a protected environment (for example, following a bone marrow transplant)    Sometimes LAIV should be delayed. Tell the provider if you or the person being vaccinated:   are not feeling well. The vaccine could be delayed until you feel better.  have gotten any other vaccines in the past 4 weeks. Live vaccines given too close together might not work as well.  have taken influenza antiviral medication in the past 48 hours.    have a very stuffy nose. 4. Risks of a vaccine reaction    With any medicine, including vaccines, there is a chance of reactions. These are usually mild and go away on their own, but serious reactions are also possible. Most people who get LAIV do not have any problems with it. Reactions to LAIV may resemble a very mild case of flu. Problems that have been reported following LAIV:     Children and adolescents 317 years of age:   runny nose/nasal congestion   cough   fever   headache and muscle aches       wheezing    abdominal pain, vomiting, or diarrhea     Adults 2549 years of age:    runny nose/nasal congestion      sore throat   cough   chills   tiredness/weakness      headache    Problems that could happen after any vaccine:    Any medication can cause a severe allergic reaction. Such reactions from a vaccine are very rare, estimated at about 1 in a million doses, and would happen within a few minutes to a few hours after the vaccination. As with any medicine, there is a very small chance of a vaccine causing a serious injury or death. The safety of vaccines is always being monitored. For more information, visit:   www.cdc.gov/vaccinesafety/    5. What if there is a serious reaction? What should I look for?  Look for anything that concerns you, such as signs of a severe allergic reaction, very high fever, or unusual behavior. Signs of a severe allergic reaction can include hives, swelling of the face and throat, difficulty breathing, a fast heartbeat, dizziness, and weakness. These would start a few minutes to a few hours after the vaccination. What should I do?  If you think it is a severe allergic reaction or other emergency that cant wait, call 9-1-1 and get the person to the nearest hospital. Otherwise, call your doctor.  Reactions should be reported to the Vaccine Adverse Event Reporting System (VAERS).  Your doctor should file this report, or you can do it yourself through the VAERS web site at www.vaers. [facility].gov, or by calling 8-147.862.1935. VAERS does not give medical advice. 6. The National Vaccine Injury Compensation Program    The [facility] Vaccine Injury Compensation Program (VICP) is a federal program that was created to compensate people who may have been injured by certain vaccines. Persons who believe they may have been injured by a vaccine can learn about the program and about filing a claim by calling 4-563.799.9601 or visiting the [employer] website at www.[facility].gov/vaccinecompensation. There is a time limit to file a claim for compensation. 7. How can I learn more?  Ask your doctor. He or she can give you the vaccine package insert or suggest other sources of information.  Call your local or state health department.  Contact the Centers for Disease Control and Prevention (CDC):  - Call 4-685.360.4444 (1-800-CDC-INFO) or  - Visit CDCs website at www.cdc.gov/flu    Vaccine Information Statement   Live Attenuated Influenza Vaccine   8/7/2015  41 JOHN Rico Pap [id number]    Department of Health and Human Services  Centers for Disease Control and Prevention    Office Use Only         Well Visit, Ages 25 to 48: Care Instructions  Your Care Instructions  Physical exams can help you stay healthy. Your doctor has checked your overall health and may have suggested ways to take good care of yourself. He or she also may have recommended tests. At home, you can help prevent illness with healthy eating, regular exercise, and other steps. Follow-up care is a key part of your treatment and safety. Be sure to make and go to all appointments, and call your doctor if you are having problems. It's also a good idea to know your test results and keep a list of the medicines you take. How can you care for yourself at home? · Reach and stay at a healthy weight.  This will lower your risk for many problems, such as obesity, diabetes, heart disease, and high blood pressure. · Get at least 30 minutes of physical activity on most days of the week. Walking is a good choice. You also may want to do other activities, such as running, swimming, cycling, or playing tennis or team sports. Discuss any changes in your exercise program with your doctor. · Do not smoke or allow others to smoke around you. If you need help quitting, talk to your doctor about stop-smoking programs and medicines. These can increase your chances of quitting for good. · Talk to your doctor about whether you have any risk factors for sexually transmitted infections (STIs). Having one sex partner (who does not have STIs and does not have sex with anyone else) is a good way to avoid these infections. · Use birth control if you do not want to have children at this time. Talk with your doctor about the choices available and what might be best for you. · Protect your skin from too much sun. When you're outdoors from 10 a.m. to 4 p.m., stay in the shade or cover up with clothing and a hat with a wide brim. Wear sunglasses that block UV rays. Even when it's cloudy, put broad-spectrum sunscreen (SPF 30 or higher) on any exposed skin. · See a dentist one or two times a year for checkups and to have your teeth cleaned. · Wear a seat belt in the car. · Drink alcohol in moderation, if at all. That means no more than 2 drinks a day for men and 1 drink a day for women. Follow your doctor's advice about when to have certain tests. These tests can spot problems early. For everyone  · Cholesterol. Have the fat (cholesterol) in your blood tested after age 21. Your doctor will tell you how often to have this done based on your age, family history, or other things that can increase your risk for heart disease. · Blood pressure. Have your blood pressure checked during a routine doctor visit.  Your doctor will tell you how often to check your blood pressure based on your age, your blood pressure results, and other factors. · Vision. Talk with your doctor about how often to have a glaucoma test.  · Diabetes. Ask your doctor whether you should have tests for diabetes. · Colon cancer. Have a test for colon cancer at age 48. You may have one of several tests. If you are younger than 48, you may need a test earlier if you have any risk factors. Risk factors include whether you already had a precancerous polyp removed from your colon or whether your parent, brother, sister, or child has had colon cancer. For women  · Breast exam and mammogram. Talk to your doctor about when you should have a clinical breast exam and a mammogram. Medical experts differ on whether and how often women under 50 should have these tests. Your doctor can help you decide what is right for you. · Pap test and pelvic exam. Begin Pap tests at age 24. A Pap test is the best way to find cervical cancer. The test often is part of a pelvic exam. Ask how often to have this test.  · Tests for sexually transmitted infections (STIs). Ask whether you should have tests for STIs. You may be at risk if you have sex with more than one person, especially if your partners do not wear condoms. For men  · Tests for sexually transmitted infections (STIs). Ask whether you should have tests for STIs. You may be at risk if you have sex with more than one person, especially if you do not wear a condom. · Testicular cancer exam. Ask your doctor whether you should check your testicles regularly. · Prostate exam. Talk to your doctor about whether you should have a blood test (called a PSA test) for prostate cancer. Experts differ on whether and when men should have this test. Some experts suggest it if you are older than 39 and are -American or have a father or brother who got prostate cancer when he was younger than 72. When should you call for help?   Watch closely for changes in your health, and be sure to contact your doctor if you have any problems or symptoms that concern you. Where can you learn more? Go to http://ruben-frank.info/. Enter P072 in the search box to learn more about \"Well Visit, Ages 25 to 48: Care Instructions. \"  Current as of: July 19, 2016  Content Version: 11.3  © 9147-5190 [employer]. Care instructions adapted under license by [employer] (which disclaims liability or warranty for this information). If you have questions about a medical condition or this instruction, always ask your healthcare professional. [place] [address] any warranty or liability for your use of this information.

## 2017-09-21 NOTE — MR AVS SNAPSHOT
Visit Information Date & Time Provider Department Dept. Phone Encounter #  
 9/21/2017  1:45 PM Liu Chaudhry MD [facility] and [place] [id number] Follow-up Instructions Return in about 6 months (around 3/21/2018) for weight loss, 1 year- annual exam. .  
 Follow-up and Disposition History Upcoming Health Maintenance Date Due INFLUENZA AGE 9 TO ADULT 8/1/2017 PAP AKA CERVICAL CYTOLOGY 5/18/2019 DTaP/Tdap/Td series (2 - Td) 6/26/2027 Allergies as of 9/21/2017  Review Complete On: 9/21/2017 By: Kia Aceves No Known Allergies Current Immunizations  Never Reviewed Name Date Influenza Vaccine (Quad) PF 9/21/2017 Tdap 6/26/2017 Not reviewed this visit You Were Diagnosed With   
  
 Codes Comments Annual physical exam    -  Primary ICD-10-CM: Z00.00 ICD-9-CM: V70.0 Encounter for immunization     ICD-10-CM: X88 ICD-9-CM: V03.89   
 PCOS (polycystic ovarian syndrome)     ICD-10-CM: E28.2 ICD-9-CM: 256.4 Vitamin D deficiency     ICD-10-CM: E55.9 ICD-9-CM: 268.9 Non-pitting edema     ICD-10-CM: R60.9 ICD-9-CM: 863. 3 Screening for hyperlipidemia     ICD-10-CM: Z13.220 ICD-9-CM: V77.91   
 BMI 50.0-59.9, adult [facility])     ICD-10-CM: J78.28 
ICD-9-CM: V85.43 Vitals BP Pulse Temp Resp Height(growth percentile) Weight(growth percentile) 133/86 68 98.4 °F (36.9 °C) (Oral) 18 5' 3\" (1.6 m) 290 lb 6.4 oz (131.7 kg) LMP SpO2 BMI OB Status Smoking Status 09/20/2017 96% 51.44 kg/m2 Unknown Never Smoker BMI and BSA Data Body Mass Index Body Surface Area 51.44 kg/m 2 2.42 m 2 Preferred Pharmacy Pharmacy Name Phone [facility] [address] [facility], [address] [facility] Floor [phone] Your Updated Medication List  
  
   
This list is accurate as of: 9/21/17  2:58 PM.  Always use your most recent med list.  
  
  
  
  
 albuterol 90 mcg/actuation inhaler Commonly known as:  PROVENTIL HFA, VENTOLIN HFA, PROAIR HFA Take 2 Puffs by inhalation every four (4) hours as needed for Wheezing.  
  
 ergocalciferol 50,000 unit capsule Commonly known as:  ERGOCALCIFEROL Take 1 capsule daily x 7 days, then take 1 capsule weekly x 7 weeks. Indications: VITAMIN D DEFICIENCY (HIGH DOSE THERAPY) IMITREX 100 mg tablet Generic drug:  SUMAtriptan Take 100 mg by mouth once as needed for Migraine. lisinopril-hydroCHLOROthiazide 20-25 mg per tablet Commonly known as:  Brayden Meres Take 1 Tab by mouth daily. metFORMIN 500 mg tablet Commonly known as:  GLUCOPHAGE Take 1 Tab by mouth two (2) times daily (with meals). Indications: POLYCYSTIC OVARIAN SYNDROME  
  
 methylPREDNISolone 4 mg tablet Commonly known as:  MEDROL (STELLA) As directed SUMAtriptan 20 mg/actuation nasal spray Commonly known as:  IMITREX  
1 [place] by Both Nostrils route as needed for Migraine. 1 spray in each nostril x1;  may repeat dose x1 after 2h  Indications: MIGRAINE  
  
 topiramate 50 mg tablet Commonly known as:  TOPAMAX Take 1 Tab by mouth two (2) times a day. We Performed the Following INFLUENZA VIRUS VAC QUAD,SPLIT,PRESV FREE SYRINGE IM [id number] CPT(R)] LIPID PANEL [31470 CPT(R)] METABOLIC PANEL, BASIC [24815 CPT(R)] [place] COLLECTION VENOUS BLOOD,VENIPUNCTURE [id number] CPT(R)] [place] IMMUNIZ ADMIN,1 SINGLE/COMB VAC/TOXOID [id number] CPT(R)] PREALBUMIN [49439 CPT(R)] REFERRAL TO OBSTETRICS AND GYNECOLOGY [REF51 Custom] Comments:  
 Please evaluate patient for PCOS, prenatal planning. VITAMIN D, 25 HYDROXY [id number] CPT(R)] Follow-up Instructions Return in about 6 months (around 3/21/2018) for weight loss, 1 year- annual exam. . Referral Information  Referral ID Referred By Referred To  
  
 [id number] ALHAJI [address]   
 [address] [place] [address], [facility] [address] Phone: [phone] Fax: [phone] Visits Status Start Date End Date 1 New Request 9/21/17 9/21/18 If your referral has a status of pending review or denied, additional information will be sent to support the outcome of this decision. Patient Instructions Vaccine Information Statement Influenza (Flu) Vaccine (Live, Intranasal): What you need to know Many Vaccine Information Statements are available in [place]ian and other languages. See www.immunize.org/vis Hojas de Información Sobre Vacunas están disponibles en Español y en muchos otros idiomas. Visite www.immunize.org/vis 1. Why get vaccinated? Influenza (flu) is a contagious disease that spreads around the [place] every year, usually between October and May. Flu is caused by influenza viruses, and is spread mainly by coughing, sneezing, and close contact. Anyone can get flu. Flu strikes suddenly and can last several days. Symptoms vary by age, but can include: 
 fever/chills  sore throat  muscle aches  fatigue  cough  headache  runny or stuffy nose Flu can also lead to pneumonia and blood infections, and cause diarrhea and seizures in children. If you have a medical condition, such as heart or lung disease, flu can make it worse. Flu is more dangerous for some people. Infants and young children, people 72years of age and older, pregnant women, and people with certain health conditions or a weakened immune system are at greatest risk. Each year thousands of people in the [facility] die from flu, and many more are hospitalized. Flu vaccine can: 
 keep you from getting flu, 
 make flu less severe if you do get it, and 
 keep you from spreading flu to your family and other people. 2. Live, attenuated flu vaccine  LAIV, Nasal [place] A dose of flu vaccine is recommended every flu season.  Children younger than 5years of age may need two doses during the same flu season. Everyone else needs only one dose each flu season. The live, attenuated influenza vaccine (called LAIV) may be given to healthy, non-pregnant people 2 through 52years of age. It may safely be given at the same time as other vaccines. LAIV is sprayed into the nose. LAIV does not contain thimerosal or other preservatives. It is made from weakened flu virus and does not cause flu. There are many flu viruses, and they are always changing. Each year LAIV is made to protect against four viruses that are likely to cause disease in the upcoming flu season. But even when the vaccine doesnt exactly match these viruses, it may still provide some protection. Flu vaccine cannot prevent: 
 flu that is caused by a virus not covered by the vaccine, or 
 illnesses that look like flu but are not. It takes about 2 weeks for protection to develop after vaccination, and protection lasts through the flu season. 3. Some people should not get this vaccine Some people should not get LAIV because of age, health conditions, or other reasons. Most of these people should get an injected flu vaccine instead. Your healthcare provider can help you decide. Tell the provider if you or the person being vaccinated: 
 have any allergies, including an allergy to eggs, or have ever had an allergic reaction to an influenza vaccine.  have ever had Guillain-Barré Syndrome (also called GBS).  have any long-term heart, breathing, kidney, liver, or nervous system problems.  have asthma or breathing problems, or are a child who has had wheezing episodes.  are pregnant.  are a child or adolescent who is receiving aspirin or aspirin-containing products.  have a weakened immune system.  will be visiting or taking care of someone, within the next 7 days, who requires a protected environment (for example, following a bone marrow transplant) Sometimes LAIV should be delayed. Tell the provider if you or the person being vaccinated: 
 are not feeling well. The vaccine could be delayed until you feel better.  have gotten any other vaccines in the past 4 weeks. Live vaccines given too close together might not work as well.  have taken influenza antiviral medication in the past 48 hours.  have a very stuffy nose. 4. Risks of a vaccine reaction With any medicine, including vaccines, there is a chance of reactions. These are usually mild and go away on their own, but serious reactions are also possible. Most people who get LAIV do not have any problems with it. Reactions to LAIV may resemble a very mild case of flu. Problems that have been reported following LAIV:  
 
Children and adolescents 317 years of age: 
 runny nose/nasal congestion  cough  fever  headache and muscle aches  wheezing  abdominal pain, vomiting, or diarrhea Adults 2549 years of age:  
 runny nose/nasal congestion  sore throat  cough  chills  tiredness/weakness  headache Problems that could happen after any vaccine:  Any medication can cause a severe allergic reaction. Such reactions from a vaccine are very rare, estimated at about 1 in a million doses, and would happen within a few minutes to a few hours after the vaccination. As with any medicine, there is a very small chance of a vaccine causing a serious injury or death. The safety of vaccines is always being monitored. For more information, visit:  
www.cdc.gov/vaccinesafety/ 
 
5. What if there is a serious reaction? What should I look for?  Look for anything that concerns you, such as signs of a severe allergic reaction, very high fever, or unusual behavior.  
 
Signs of a severe allergic reaction can include hives, swelling of the face and throat, difficulty breathing, a fast heartbeat, dizziness, and weakness. These would start a few minutes to a few hours after the vaccination. What should I do?  If you think it is a severe allergic reaction or other emergency that cant wait, call 9-1-1 and get the person to the nearest hospital. Otherwise, call your doctor.  Reactions should be reported to the Vaccine Adverse Event Reporting System (VAERS). Your doctor should file this report, or you can do it yourself through the VAERS web site at www.vaers. [facility].gov, or by calling 1-875.828.3396. VAERS does not give medical advice. 6. The National Vaccine Injury Compensation Program 
 
The [facility] Vaccine Injury Compensation Program (VICP) is a federal program that was created to compensate people who may have been injured by certain vaccines. Persons who believe they may have been injured by a vaccine can learn about the program and about filing a claim by calling 2-216.928.3581 or visiting the [address] website at www.[facility].gov/vaccinecompensation. There is a time limit to file a claim for compensation. 7. How can I learn more?  Ask your doctor. He or she can give you the vaccine package insert or suggest other sources of information.  Call your local or state health department.  Contact the Centers for Disease Control and Prevention (CDC): 
- Call 4-106.313.8124 (1-800-CDC-INFO) or 
- Visit CDCs website at www.cdc.gov/flu Vaccine Information Statement Live Attenuated Influenza Vaccine 8/7/2015 
42 JOHN [address] [id number] Department of [facility] and [employer] Centers for Disease Control and Prevention Office Use Only Well Visit, Ages 25 to 48: Care Instructions Your Care Instructions Physical exams can help you stay healthy. Your doctor has checked your overall health and may have suggested ways to take good care of yourself. He or she also may have recommended tests. At home, you can help prevent illness with healthy eating, regular exercise, and other steps. Follow-up care is a key part of your treatment and safety. Be sure to make and go to all appointments, and call your doctor if you are having problems. It's also a good idea to know your test results and keep a list of the medicines you take. How can you care for yourself at home? · Reach and stay at a healthy weight. This will lower your risk for many problems, such as obesity, diabetes, heart disease, and high blood pressure. · Get at least 30 minutes of physical activity on most days of the week. Walking is a good choice. You also may want to do other activities, such as running, swimming, cycling, or playing tennis or team sports. Discuss any changes in your exercise program with your doctor. · Do not smoke or allow others to smoke around you. If you need help quitting, talk to your doctor about stop-smoking programs and medicines. These can increase your chances of quitting for good. · Talk to your doctor about whether you have any risk factors for sexually transmitted infections (STIs). Having one sex partner (who does not have STIs and does not have sex with anyone else) is a good way to avoid these infections. · Use birth control if you do not want to have children at this time. Talk with your doctor about the choices available and what might be best for you. · Protect your skin from too much sun. When you're outdoors from 10 a.m. to 4 p.m., stay in the shade or cover up with clothing and a hat with a wide brim. Wear sunglasses that block UV rays. Even when it's cloudy, put broad-spectrum sunscreen (SPF 30 or higher) on any exposed skin. · See a dentist one or two times a year for checkups and to have your teeth cleaned. · Wear a seat belt in the car. · Drink alcohol in moderation, if at all. That means no more than 2 drinks a day for men and 1 drink a day for women. Follow your doctor's advice about when to have certain tests. These tests can spot problems early. For everyone · Cholesterol. Have the fat (cholesterol) in your blood tested after age 21. Your doctor will tell you how often to have this done based on your age, family history, or other things that can increase your risk for heart disease. · Blood pressure. Have your blood pressure checked during a routine doctor visit. Your doctor will tell you how often to check your blood pressure based on your age, your blood pressure results, and other factors. · Vision. Talk with your doctor about how often to have a glaucoma test. 
· Diabetes. Ask your doctor whether you should have tests for diabetes. · Colon cancer. Have a test for colon cancer at age 48. You may have one of several tests. If you are younger than 48, you may need a test earlier if you have any risk factors. Risk factors include whether you already had a precancerous polyp removed from your colon or whether your parent, brother, sister, or child has had colon cancer. For women · Breast exam and mammogram. Talk to your doctor about when you should have a clinical breast exam and a mammogram. Medical experts differ on whether and how often women under 50 should have these tests. Your doctor can help you decide what is right for you. · Pap test and pelvic exam. Begin Pap tests at age 24. A Pap test is the best way to find cervical cancer. The test often is part of a pelvic exam. Ask how often to have this test. 
· Tests for sexually transmitted infections (STIs). Ask whether you should have tests for STIs. You may be at risk if you have sex with more than one person, especially if your partners do not wear condoms. For men · Tests for sexually transmitted infections (STIs). Ask whether you should have tests for STIs. You may be at risk if you have sex with more than one person, especially if you do not wear a condom. · Testicular cancer exam. Ask your doctor whether you should check your testicles regularly. · Prostate exam. Talk to your doctor about whether you should have a blood test (called a PSA test) for prostate cancer. Experts differ on whether and when men should have this test. Some experts suggest it if you are older than 39 and are -American or have a father or brother who got prostate cancer when he was younger than 72. When should you call for help? Watch closely for changes in your health, and be sure to contact your doctor if you have any problems or symptoms that concern you. Where can you learn more? Go to http://ruben-frank.info/. Enter P072 in the search box to learn more about \"Well Visit, Ages 25 to 48: Care Instructions. \" Current as of: July 19, 2016 Content Version: 11.3 © 9048-8075 [employer]. Care instructions adapted under license by [employer] (which disclaims liability or warranty for this information). If you have questions about a medical condition or this instruction, always ask your healthcare professional. [place] [address] any warranty or liability for your use of this information. Patient Instructions History Introducing [facility] & HEALTH SERVICES! Dear Sergio Santos: 
Thank you for requesting a [employer] account. Our records indicate that you already have an active E[employer]ault account. You can access your account anytime at [email or web address] Did you know that you can access your hospital and ER discharge instructions at any time in EnerVault? You can also review all of your test results from your hospital stay or ER visit. Additional Information If you have questions, please visit the Frequently Asked Questions section of the [employer] website at [email or web address] Remember, EnerVault is NOT to be used for urgent needs. For medical emergencies, dial 911. Now available from your iPhone and Android! Please provide this summary of care documentation to your next provider. Your primary care clinician is listed as Anthony Garcia. If you have any questions after today's visit, please call [phone].

## 2017-09-21 NOTE — PROGRESS NOTES
Ms. Harry Ramesh is a 31y.o. year old female who had concerns including Complete Physical and Hypertension. HPI:  Chief Complaint   Patient presents with    Complete Physical     (RM 5)    Hypertension     Vitamin D Deficiency- vitamin D levels show significantly low levels requiring vitamin D high dose repletion. Patient has completed 8 weeks of therapy and is here today to check levels. No neg SE with the medication. No renal, hepatic or known GI malabsorption disorder. Limited sun exposure. No proximal muscle weakness, myalgias or lower back pain. Lab Results   Component Value Date/Time    VITAMIN D, 25-HYDROXY 13.7 05/18/2016 03:26 PM       Hypertension x 2012. Swelling. Body mass index is 51.44 kg/(m^2). No chest pain or SOB. BP Readings from Last 3 Encounters:   09/21/17 133/86   06/27/17 141/83   06/26/17 130/85     Wt Readings from Last 3 Encounters:   09/21/17 290 lb 6.4 oz (131.7 kg)   06/27/17 292 lb (132.5 kg)   06/26/17 292 lb 3.2 oz (132.5 kg)         Past Medical History:   Diagnosis Date    Asthma     Hypertension     Ill-defined condition     cellulitis, KIMMY    Neurological disorder     Migraines    Sleep apnea      Current Outpatient Prescriptions   Medication Sig Dispense    SUMAtriptan (IMITREX) 100 mg tablet Take 100 mg by mouth once as needed for Migraine.  metFORMIN (GLUCOPHAGE) 500 mg tablet Take 1 Tab by mouth two (2) times daily (with meals). Indications: POLYCYSTIC OVARIAN SYNDROME 60 Tab    lisinopril-hydroCHLOROthiazide (PRINZIDE, ZESTORETIC) 20-25 mg per tablet Take 1 Tab by mouth daily. 90 Tab    topiramate (TOPAMAX) 50 mg tablet Take 1 Tab by mouth two (2) times a day. 60 Tab    methylPREDNISolone (MEDROL, STELLA,) 4 mg tablet As directed 1 Dose Pack    SUMAtriptan (IMITREX) 20 mg/actuation nasal spray 1 [place] by Both Nostrils route as needed for Migraine.  1 spray in each nostril x1;  may repeat dose x1 after 2h  Indications: MIGRAINE 1 Container    albuterol (PROVENTIL HFA, VENTOLIN HFA, PROAIR HFA) 90 mcg/actuation inhaler Take 2 Puffs by inhalation every four (4) hours as needed for Wheezing. 1 Inhaler    ergocalciferol (ERGOCALCIFEROL) 50,000 unit capsule Take 1 capsule daily x 7 days, then take 1 capsule weekly x 7 weeks. Indications: VITAMIN D DEFICIENCY (HIGH DOSE THERAPY) 14 Cap     No current facility-administered medications for this visit. Reviewed PmHx, RxHx, FmHx, SocHx, AllgHx and updated and dated in the chart. ROS: Negative except for BOLD  General: fever, chills, fatigue  Respiratory: cough, SOB, wheezing  Cardiovascular:  CP, palpitation, MANZO, edema   Gastrointestinal: N/V/D, bleeding  Genito-Urinary: dysuria, hematuria  Musculoskeletal: muscle weakness, pain, swelling    OBJECTIVE:   Visit Vitals    /86    Pulse 68    Temp 98.4 °F (36.9 °C) (Oral)    Resp 18    Ht 5' 3\" (1.6 m)    Wt 290 lb 6.4 oz (131.7 kg)    LMP 09/20/2017    SpO2 96%    BMI 51.44 kg/m2     GEN: The patient appears well, alert, oriented x 3, in no distress. ENT: bilateral TM and canal normal.  Neck supple. No adenopathy or thyromegaly. [place]. Lungs: clear bilaterally, good air entry, no wheezes, rhonchi or rales. Cardiovascular: regular rate and rhythm. S1 and S2 normal, no murmurs,  Abdomen: + BS, soft without tenderness, guarding, rebound, mass or organomegaly. Extremities: trace bilateral edema, normal peripheral pulses. Neurological: normal, gross sensory and motor in tact without focal findings. Assessment/ Plan:       ICD-10-CM ICD-9-CM    1. Annual physical exam Z00.00 V70.0 INFLUENZA VIRUS VAC QUAD,SPLIT,PRESV FREE SYRINGE IM      RI IMMUNIZ ADMIN,1 SINGLE/COMB VAC/TOXOID      REFERRAL TO OBSTETRICS AND GYNECOLOGY      LIPID PANEL      METABOLIC PANEL, BASIC      RI COLLECTION VENOUS BLOOD,VENIPUNCTURE      VITAMIN D, 25 HYDROXY      PREALBUMIN   2.  Encounter for immunization Z23 V03.89 INFLUENZA VIRUS VAC QUAD,SPLIT,PRESV FREE SYRINGE IM      NM IMMUNIZ ADMIN,1 SINGLE/COMB VAC/TOXOID   3. PCOS (polycystic ovarian syndrome) E28.2 256.4 REFERRAL TO OBSTETRICS AND GYNECOLOGY   4. Vitamin D deficiency E55.9 268.9 VITAMIN D, 25 HYDROXY   5. Non-pitting edema O92.1 387.6 METABOLIC PANEL, BASIC      PREALBUMIN   6. Screening for hyperlipidemia Z13.220 V77.91 LIPID PANEL   7. BMI 50.0-59.9, adult (HCC) Z68.43 V85.43        Weight loss continue, lifestyle modifiation. Pt has resigned up for the gym. I have discussed the diagnosis with the patient and the intended plan as seen in the above orders. The patient has received an after-visit summary and questions were answered concerning future plans. Medication Side Effects and Warnings were discussed with patient.     Follow-up Disposition:  Return in about 6 months (around 3/21/2018) for weight loss, 1 year- annual exam. .      Scooby Loaiza MD

## 2017-09-21 NOTE — PROGRESS NOTES
Chief Complaint   Patient presents with    Complete Physical    Hypertension     Started menstrual period yesterday after 2 years

## 2017-09-25 LAB
25(OH)D3+25(OH)D2 SERPL-MCNC: 14.9 NG/ML (ref 30–100)
BUN SERPL-MCNC: 9 MG/DL (ref 6–20)
BUN/CREAT SERPL: 13 (ref 9–23)
CALCIUM SERPL-MCNC: 9.7 MG/DL (ref 8.7–10.2)
CHLORIDE SERPL-SCNC: 98 MMOL/L (ref 96–106)
CHOLEST SERPL-MCNC: 158 MG/DL (ref 100–199)
CO2 SERPL-SCNC: 27 MMOL/L (ref 18–29)
CREAT SERPL-MCNC: 0.69 MG/DL (ref 0.57–1)
GLUCOSE SERPL-MCNC: 94 MG/DL (ref 65–99)
HDLC SERPL-MCNC: 44 MG/DL
LDLC SERPL CALC-MCNC: 76 MG/DL (ref 0–99)
POTASSIUM SERPL-SCNC: 3.6 MMOL/L (ref 3.5–5.2)
PREALB SERPL-MCNC: 25 MG/DL (ref 14–35)
SODIUM SERPL-SCNC: 140 MMOL/L (ref 134–144)
SPECIMEN STATUS REPORT, ROLRST: NORMAL
TRIGL SERPL-MCNC: 192 MG/DL (ref 0–149)
VLDLC SERPL CALC-MCNC: 38 MG/DL (ref 5–40)

## 2017-09-27 DIAGNOSIS — E55.9 VITAMIN D INSUFFICIENCY: ICD-10-CM

## 2017-09-27 NOTE — TELEPHONE ENCOUNTER
From: Leroy Erickson  To: Ange Singh MD  Sent: 9/27/2017 7:12 AM EDT  Subject: Medication Renewal Request    Original authorizing provider: Ange Singh MD    Yasmine Shahid would like a refill of the following medications:  albuterol (PROVENTIL HFA, VENTOLIN HFA, PROAIR HFA) 90 mcg/actuation inhaler Ange Singh MD]  ergocalciferol (ERGOCALCIFEROL) 50,000 unit capsule Ange Singh MD]    Preferred pharmacy: [facility] [address]:

## 2017-09-28 RX ORDER — ALBUTEROL SULFATE 90 UG/1
2 AEROSOL, METERED RESPIRATORY (INHALATION)
Qty: 1 INHALER | Refills: 12 | Status: SHIPPED | OUTPATIENT
Start: 2017-09-28

## 2017-09-28 RX ORDER — ERGOCALCIFEROL 1.25 MG/1
CAPSULE ORAL
Qty: 14 CAP | Refills: 0 | Status: SHIPPED | OUTPATIENT
Start: 2017-09-28

## 2017-09-28 RX ORDER — SUMATRIPTAN 100 MG/1
TABLET, FILM COATED ORAL
Qty: 9 TAB | Refills: 1 | Status: SHIPPED | OUTPATIENT
Start: 2017-09-28

## 2017-11-10 ENCOUNTER — TELEPHONE (OUTPATIENT)
Dept: INTERNAL MEDICINE CLINIC | Age: 27
End: 2017-11-10

## 2017-11-10 DIAGNOSIS — F39 MOOD DISORDER (HCC): Primary | ICD-10-CM

## 2017-11-10 NOTE — TELEPHONE ENCOUNTER
Patient from my chart is wanting a referral to a psychiatrist. She believes she has bipolar depression.

## 2018-03-06 DIAGNOSIS — G93.2 PSEUDOTUMOR CEREBRI: ICD-10-CM

## 2018-03-06 DIAGNOSIS — G43.709 CHRONIC MIGRAINE WITHOUT AURA WITHOUT STATUS MIGRAINOSUS, NOT INTRACTABLE: ICD-10-CM

## 2018-03-06 RX ORDER — TOPIRAMATE 50 MG/1
TABLET, FILM COATED ORAL
Qty: 60 TAB | Refills: 2 | Status: SHIPPED | OUTPATIENT
Start: 2018-03-06

## 2022-03-19 PROBLEM — E55.9 VITAMIN D DEFICIENCY: Status: ACTIVE | Noted: 2017-09-21

## 2023-05-05 ENCOUNTER — HOSPITAL ENCOUNTER (EMERGENCY)
Age: 33
Discharge: HOME | End: 2023-05-05
Attending: EMERGENCY MEDICINE
Payer: MEDICAID

## 2023-05-05 VITALS
WEIGHT: 256.39 LBS | RESPIRATION RATE: 18 BRPM | BODY MASS INDEX: 45.43 KG/M2 | SYSTOLIC BLOOD PRESSURE: 142 MMHG | HEART RATE: 69 BPM | DIASTOLIC BLOOD PRESSURE: 102 MMHG | OXYGEN SATURATION: 96 % | HEIGHT: 63 IN | TEMPERATURE: 97.9 F

## 2023-05-05 DIAGNOSIS — K64.9 ACUTE HEMORRHOID: Primary | ICD-10-CM

## 2023-05-05 DIAGNOSIS — K62.89 ANAL OR RECTAL PAIN: ICD-10-CM

## 2023-05-05 PROCEDURE — 99283 EMERGENCY DEPT VISIT LOW MDM: CPT

## 2023-05-05 PROCEDURE — 74011000250 HC RX REV CODE- 250: Performed by: EMERGENCY MEDICINE

## 2023-05-05 RX ORDER — LIDOCAINE HYDROCHLORIDE 20 MG/ML
JELLY TOPICAL
Status: COMPLETED | OUTPATIENT
Start: 2023-05-05 | End: 2023-05-05

## 2023-05-05 RX ADMIN — LIDOCAINE HYDROCHLORIDE: 20 JELLY TOPICAL at 06:12

## 2023-05-26 RX ORDER — ALBUTEROL SULFATE 90 UG/1
2 AEROSOL, METERED RESPIRATORY (INHALATION) EVERY 4 HOURS PRN
COMMUNITY
Start: 2017-09-28

## 2023-05-26 RX ORDER — LISINOPRIL AND HYDROCHLOROTHIAZIDE 25; 20 MG/1; MG/1
1 TABLET ORAL DAILY
COMMUNITY
Start: 2017-06-26

## 2023-05-26 RX ORDER — SUMATRIPTAN 20 MG/1
1 SPRAY NASAL PRN
COMMUNITY
Start: 2016-09-06

## 2023-05-26 RX ORDER — TOPIRAMATE 50 MG/1
1 TABLET, FILM COATED ORAL 2 TIMES DAILY
COMMUNITY
Start: 2018-03-06

## 2023-05-26 RX ORDER — METHYLPREDNISOLONE 4 MG/1
TABLET ORAL
COMMUNITY
Start: 2016-10-20

## 2023-05-26 RX ORDER — ERGOCALCIFEROL 1.25 MG/1
CAPSULE ORAL
COMMUNITY
Start: 2017-09-28

## 2023-05-26 RX ORDER — SUMATRIPTAN 100 MG/1
TABLET, FILM COATED ORAL
COMMUNITY
Start: 2017-09-28

## 2023-08-17 SDOH — HEALTH STABILITY: PHYSICAL HEALTH: ON AVERAGE, HOW MANY MINUTES DO YOU ENGAGE IN EXERCISE AT THIS LEVEL?: 60 MIN

## 2023-08-17 SDOH — HEALTH STABILITY: PHYSICAL HEALTH: ON AVERAGE, HOW MANY DAYS PER WEEK DO YOU ENGAGE IN MODERATE TO STRENUOUS EXERCISE (LIKE A BRISK WALK)?: 1 DAY

## 2023-08-18 ENCOUNTER — OFFICE VISIT (OUTPATIENT)
Facility: CLINIC | Age: 33
End: 2023-08-18

## 2023-08-18 VITALS
WEIGHT: 261.1 LBS | SYSTOLIC BLOOD PRESSURE: 137 MMHG | DIASTOLIC BLOOD PRESSURE: 84 MMHG | HEIGHT: 63 IN | OXYGEN SATURATION: 96 % | HEART RATE: 71 BPM | BODY MASS INDEX: 46.26 KG/M2 | TEMPERATURE: 98.1 F | RESPIRATION RATE: 20 BRPM

## 2023-08-18 DIAGNOSIS — E66.01 MORBID OBESITY (HCC): ICD-10-CM

## 2023-08-18 DIAGNOSIS — Z00.00 PHYSICAL EXAM: Primary | ICD-10-CM

## 2023-08-18 DIAGNOSIS — I87.2 VENOUS INSUFFICIENCY: ICD-10-CM

## 2023-08-18 DIAGNOSIS — L70.9 ACNE, UNSPECIFIED ACNE TYPE: ICD-10-CM

## 2023-08-18 DIAGNOSIS — G44.1 VASCULAR HEADACHE: ICD-10-CM

## 2023-08-18 DIAGNOSIS — Z83.3 FAMILY HISTORY OF DIABETES MELLITUS: ICD-10-CM

## 2023-08-18 LAB
ALBUMIN SERPL-MCNC: 3.8 G/DL (ref 3.5–5)
ALBUMIN/GLOB SERPL: 0.9 (ref 1.1–2.2)
ALP SERPL-CCNC: 56 U/L (ref 45–117)
ALT SERPL-CCNC: 15 U/L (ref 12–78)
ANION GAP SERPL CALC-SCNC: 4 MMOL/L (ref 5–15)
APPEARANCE UR: CLEAR
AST SERPL-CCNC: 16 U/L (ref 15–37)
BACTERIA URNS QL MICRO: ABNORMAL /HPF
BASOPHILS # BLD: 0 K/UL (ref 0–0.1)
BASOPHILS NFR BLD: 1 % (ref 0–1)
BILIRUB SERPL-MCNC: 0.3 MG/DL (ref 0.2–1)
BILIRUB UR QL: NEGATIVE
BUN SERPL-MCNC: 10 MG/DL (ref 6–20)
BUN/CREAT SERPL: 13 (ref 12–20)
CALCIUM SERPL-MCNC: 9.7 MG/DL (ref 8.5–10.1)
CHLORIDE SERPL-SCNC: 106 MMOL/L (ref 97–108)
CHOLEST SERPL-MCNC: 158 MG/DL
CO2 SERPL-SCNC: 30 MMOL/L (ref 21–32)
COLOR UR: ABNORMAL
CREAT SERPL-MCNC: 0.77 MG/DL (ref 0.55–1.02)
DIFFERENTIAL METHOD BLD: ABNORMAL
EOSINOPHIL # BLD: 0.1 K/UL (ref 0–0.4)
EOSINOPHIL NFR BLD: 2 % (ref 0–7)
EPITH CASTS URNS QL MICRO: ABNORMAL /LPF
ERYTHROCYTE [DISTWIDTH] IN BLOOD BY AUTOMATED COUNT: 15 % (ref 11.5–14.5)
EST. AVERAGE GLUCOSE BLD GHB EST-MCNC: 126 MG/DL
GLOBULIN SER CALC-MCNC: 4.1 G/DL (ref 2–4)
GLUCOSE SERPL-MCNC: 87 MG/DL (ref 65–100)
GLUCOSE UR STRIP.AUTO-MCNC: NEGATIVE MG/DL
HBA1C MFR BLD: 6 % (ref 4–5.6)
HCT VFR BLD AUTO: 39.3 % (ref 35–47)
HDLC SERPL-MCNC: 54 MG/DL
HDLC SERPL: 2.9 (ref 0–5)
HGB BLD-MCNC: 11.6 G/DL (ref 11.5–16)
HGB UR QL STRIP: ABNORMAL
IMM GRANULOCYTES # BLD AUTO: 0 K/UL (ref 0–0.04)
IMM GRANULOCYTES NFR BLD AUTO: 0 % (ref 0–0.5)
KETONES UR QL STRIP.AUTO: NEGATIVE MG/DL
LDLC SERPL CALC-MCNC: 70 MG/DL (ref 0–100)
LEUKOCYTE ESTERASE UR QL STRIP.AUTO: NEGATIVE
LYMPHOCYTES # BLD: 2.5 K/UL (ref 0.8–3.5)
LYMPHOCYTES NFR BLD: 55 % (ref 12–49)
MCH RBC QN AUTO: 25.8 PG (ref 26–34)
MCHC RBC AUTO-ENTMCNC: 29.5 G/DL (ref 30–36.5)
MCV RBC AUTO: 87.5 FL (ref 80–99)
MONOCYTES # BLD: 0.4 K/UL (ref 0–1)
MONOCYTES NFR BLD: 9 % (ref 5–13)
NEUTS SEG # BLD: 1.5 K/UL (ref 1.8–8)
NEUTS SEG NFR BLD: 33 % (ref 32–75)
NITRITE UR QL STRIP.AUTO: NEGATIVE
NRBC # BLD: 0 K/UL (ref 0–0.01)
NRBC BLD-RTO: 0 PER 100 WBC
PH UR STRIP: 7 (ref 5–8)
PLATELET # BLD AUTO: 370 K/UL (ref 150–400)
PMV BLD AUTO: 9.2 FL (ref 8.9–12.9)
POTASSIUM SERPL-SCNC: 4.2 MMOL/L (ref 3.5–5.1)
PROT SERPL-MCNC: 7.9 G/DL (ref 6.4–8.2)
PROT UR STRIP-MCNC: NEGATIVE MG/DL
RBC # BLD AUTO: 4.49 M/UL (ref 3.8–5.2)
RBC #/AREA URNS HPF: ABNORMAL /HPF (ref 0–5)
SODIUM SERPL-SCNC: 140 MMOL/L (ref 136–145)
SP GR UR REFRACTOMETRY: 1.02 (ref 1–1.03)
TRIGL SERPL-MCNC: 170 MG/DL
UROBILINOGEN UR QL STRIP.AUTO: 0.2 EU/DL (ref 0.2–1)
VLDLC SERPL CALC-MCNC: 34 MG/DL
WBC # BLD AUTO: 4.6 K/UL (ref 3.6–11)
WBC URNS QL MICRO: ABNORMAL /HPF (ref 0–4)

## 2023-08-18 RX ORDER — PANTOPRAZOLE SODIUM 20 MG/1
40 TABLET, DELAYED RELEASE ORAL DAILY
COMMUNITY

## 2023-08-18 RX ORDER — ASCORBIC ACID 100 MG
1 TABLET,CHEWABLE ORAL
COMMUNITY

## 2023-08-18 RX ORDER — OMEPRAZOLE 20 MG/1
20 CAPSULE, DELAYED RELEASE ORAL DAILY
COMMUNITY

## 2023-08-18 RX ORDER — RIZATRIPTAN BENZOATE 10 MG/1
10 TABLET ORAL PRN
COMMUNITY
Start: 2021-03-17

## 2023-08-18 RX ORDER — NORTRIPTYLINE HYDROCHLORIDE 10 MG/1
CAPSULE ORAL
COMMUNITY
Start: 2021-09-10

## 2023-08-18 RX ORDER — RIMEGEPANT SULFATE 75 MG/75MG
75 TABLET, ORALLY DISINTEGRATING ORAL PRN
COMMUNITY
Start: 2021-03-17

## 2023-08-18 ASSESSMENT — PATIENT HEALTH QUESTIONNAIRE - PHQ9
SUM OF ALL RESPONSES TO PHQ QUESTIONS 1-9: 0
2. FEELING DOWN, DEPRESSED OR HOPELESS: 0
SUM OF ALL RESPONSES TO PHQ QUESTIONS 1-9: 0
SUM OF ALL RESPONSES TO PHQ9 QUESTIONS 1 & 2: 0
1. LITTLE INTEREST OR PLEASURE IN DOING THINGS: 0

## 2023-08-18 NOTE — PROGRESS NOTES
[facility]  [address] [address]  Phone:  [phone]  Fax: [phone]       Chief Complaint   Patient presents with    Establish Care    Annual Exam   .      SUBJECTIVE:    Sada Montes De Oca is a 34 y.o. female  Dictation on: 08/18/2023 12:07 PM by: Polina Bowen [[id number]]          Current Outpatient Medications   Medication Sig Dispense Refill    Multiple Vitamin (QUINTABS) TABS Take 1 tablet by mouth      nortriptyline (PAMELOR) 10 MG capsule Nortriptyline 10mg capsule, 1 capsule nightly for 7 days then 2 capsules nightly thereafter      Rimegepant Sulfate (NURTEC) 75 MG TBDP Take 75 mg by mouth as needed      rizatriptan (MAXALT) 10 MG tablet Take 1 tablet by mouth as needed      omeprazole (PRILOSEC) 20 MG delayed release capsule Take 1 capsule by mouth daily      pantoprazole (PROTONIX) 20 MG tablet Take 2 tablets by mouth daily      albuterol sulfate HFA (PROVENTIL;VENTOLIN;PROAIR) 108 (90 Base) MCG/ACT inhaler Inhale 2 puffs into the lungs every 4 hours as needed      ergocalciferol (ERGOCALCIFEROL) 1.25 MG (50322 UT) capsule Take 1 capsule daily x 7 days, then take 1 capsule weekly x 7 weeks. Indications: VITAMIN D DEFICIENCY (HIGH DOSE THERAPY)      Hydrocort-Pramoxine, Perianal, (PROCTOFOAM-HC) 1-1 % rectal foam Place 1 applicator rectally 2 times daily      lisinopril-hydroCHLOROthiazide (PRINZIDE;ZESTORETIC) 20-25 MG per tablet Take 1 tablet by mouth daily      metFORMIN (GLUCOPHAGE) 500 MG tablet Take 1 tablet by mouth 2 times daily (with meals)      methylPREDNISolone (MEDROL DOSEPACK) 4 MG tablet As directed      SUMAtriptan (IMITREX) 20 MG/ACT nasal spray 1 spray by Nasal route as needed      SUMAtriptan (IMITREX) 100 MG tablet TAKE 1 TABLET BY MOUTH ONCE AS NEEDED FOR MIGRAINE FOR UP TO 1 DOSE      topiramate (TOPAMAX) 50 MG tablet Take 1 tablet by mouth 2 times daily       No current facility-administered medications for this visit.

## 2023-08-19 PROBLEM — L70.9 ACNE: Status: ACTIVE | Noted: 2023-08-19

## 2023-08-19 PROBLEM — G44.1 VASCULAR HEADACHE: Status: ACTIVE | Noted: 2023-08-19

## 2023-08-19 PROBLEM — Z83.3 FAMILY HISTORY OF DIABETES MELLITUS: Status: ACTIVE | Noted: 2023-08-19

## 2023-08-19 PROBLEM — I87.2 VENOUS INSUFFICIENCY: Status: ACTIVE | Noted: 2023-08-19

## 2023-08-19 PROBLEM — E66.01 MORBID OBESITY (HCC): Status: ACTIVE | Noted: 2023-08-19

## 2023-08-19 NOTE — PROGRESS NOTES
1. The patient needs to continue with weight reduction. She wants to try GLP-1 agonist.  This may be a bit problematic given the fact that she had a gastric sleeve for bariatric surgery. 2. She is taking spironolactone for her acne given to her by her physician in [place]. 3. She continues to have vascular headaches and probably needs to followup with her neurologist.  4. She has venous insufficiency, but it is minimal currently. I explained the entity to the patient. 5. She has a strong family history of diabetes mellitus in that her mother has this. This means her life time risk of getting diabetes is 40% and probably higher given her morbid obesity.

## 2023-08-19 NOTE — PROGRESS NOTES
comes in as a new patient. She has had a problem with obesity most of her life. She ended up having a bariatric surgery starting out at 318 and losing down to 230. She has regained 30 pounds. She has a history of chronic headaches most likely vascular in origin. She has seen a neurologist in the past and is taking medication for this. She formerly had hypertension, but this is when she weighed over 300 pounds, but once the bariatric surgery occurred she has not taken her antihypertensive medication. She has a history of acne and venous insufficiency with occasional orthostatic swelling of her lower extremities.

## 2023-08-24 ENCOUNTER — PATIENT MESSAGE (OUTPATIENT)
Facility: CLINIC | Age: 33
End: 2023-08-24

## 2023-09-05 PROBLEM — R73.02 IMPAIRED GLUCOSE TOLERANCE: Status: ACTIVE | Noted: 2023-09-05

## 2023-09-15 ENCOUNTER — TELEPHONE (OUTPATIENT)
Facility: CLINIC | Age: 33
End: 2023-09-15

## 2023-09-15 NOTE — TELEPHONE ENCOUNTER
----- Message from Monico Vitale MD sent at 9/5/2023 10:44 PM EDT -----  Patient needs a gammopathy eval

## 2023-09-20 ENCOUNTER — NURSE ONLY (OUTPATIENT)
Facility: CLINIC | Age: 33
End: 2023-09-20

## 2023-09-20 DIAGNOSIS — D89.2 PARAPROTEINEMIA: Primary | ICD-10-CM

## 2023-09-27 LAB
ALBUMIN SERPL ELPH-MCNC: 3.7 G/DL (ref 2.9–4.4)
ALBUMIN/GLOB SERPL: 1.1 (ref 0.7–1.7)
ALPHA1 GLOB SERPL ELPH-MCNC: 0.2 G/DL (ref 0–0.4)
ALPHA2 GLOB SERPL ELPH-MCNC: 0.8 G/DL (ref 0.4–1)
B-GLOBULIN SERPL ELPH-MCNC: 1.2 G/DL (ref 0.7–1.3)
GAMMA GLOB SERPL ELPH-MCNC: 1.3 G/DL (ref 0.4–1.8)
GLOBULIN SER-MCNC: 3.4 G/DL (ref 2.2–3.9)
IGA SERPL-MCNC: 326 MG/DL (ref 87–352)
IGG SERPL-MCNC: 1399 MG/DL (ref 586–1602)
IGM SERPL-MCNC: 100 MG/DL (ref 26–217)
INTERPRETATION SERPL IEP-IMP: ABNORMAL
KAPPA LC FREE SER-MCNC: 20 MG/L (ref 3.3–19.4)
KAPPA LC FREE/LAMBDA FREE SER: 2.06 (ref 0.26–1.65)
LAMBDA LC FREE SERPL-MCNC: 9.7 MG/L (ref 5.7–26.3)
M PROTEIN SERPL ELPH-MCNC: ABNORMAL G/DL
PROT SERPL-MCNC: 7.1 G/DL (ref 6–8.5)

## 2023-10-13 NOTE — TELEPHONE ENCOUNTER
From: Carmen Rowe  To: Dr. Jorge Barakat: 8/24/2023 7:22 AM EDT  Subject: Test Results     Good morning Dr. Al Garcia. I am reaching out because I have reviewed my test results from the bloodwork done and just would like some clarity on a few of the things that I saw. Would like to chat whenever you are available. Thank you for your time.      Best Regards,     Burak Dalton

## 2023-10-13 NOTE — TELEPHONE ENCOUNTER
Spoke with Dr. Eleuterio King. Pt notified that labs are ok with the exception of the A1C, which is in the prediabetes state.  Pt advised to diet, exercise, avoid sweets

## 2023-11-17 ENCOUNTER — TELEPHONE (OUTPATIENT)
Facility: CLINIC | Age: 33
End: 2023-11-17

## 2023-12-19 ENCOUNTER — OFFICE VISIT (OUTPATIENT)
Facility: CLINIC | Age: 33
End: 2023-12-19
Payer: COMMERCIAL

## 2023-12-19 VITALS
BODY MASS INDEX: 46.94 KG/M2 | HEIGHT: 63 IN | TEMPERATURE: 98.3 F | DIASTOLIC BLOOD PRESSURE: 77 MMHG | WEIGHT: 264.9 LBS | SYSTOLIC BLOOD PRESSURE: 110 MMHG | RESPIRATION RATE: 16 BRPM | HEART RATE: 66 BPM | OXYGEN SATURATION: 98 %

## 2023-12-19 DIAGNOSIS — E66.01 MORBID OBESITY (HCC): Primary | ICD-10-CM

## 2023-12-19 DIAGNOSIS — J30.0 VASOMOTOR RHINITIS: ICD-10-CM

## 2023-12-19 DIAGNOSIS — E28.2 PCOS (POLYCYSTIC OVARIAN SYNDROME): ICD-10-CM

## 2023-12-19 DIAGNOSIS — R73.02 IMPAIRED GLUCOSE TOLERANCE: ICD-10-CM

## 2023-12-19 DIAGNOSIS — G47.33 OBSTRUCTIVE SLEEP APNEA: ICD-10-CM

## 2023-12-19 PROCEDURE — 99214 OFFICE O/P EST MOD 30 MIN: CPT | Performed by: INTERNAL MEDICINE

## 2023-12-19 RX ORDER — FLUTICASONE PROPIONATE 50 MCG
2 SPRAY, SUSPENSION (ML) NASAL DAILY
Qty: 16 G | Refills: 11 | Status: SHIPPED | OUTPATIENT
Start: 2023-12-19

## 2023-12-20 LAB
HBA1C MFR BLD: 6.1 % (ref 4.8–5.6)
TSH SERPL DL<=0.005 MIU/L-ACNC: 1.25 UIU/ML (ref 0.45–4.5)

## 2024-02-01 ENCOUNTER — OFFICE VISIT (OUTPATIENT)
Age: 34
End: 2024-02-01
Payer: COMMERCIAL

## 2024-02-01 VITALS
BODY MASS INDEX: 46.9 KG/M2 | WEIGHT: 264.7 LBS | OXYGEN SATURATION: 99 % | DIASTOLIC BLOOD PRESSURE: 94 MMHG | HEART RATE: 71 BPM | HEIGHT: 63 IN | SYSTOLIC BLOOD PRESSURE: 141 MMHG

## 2024-02-01 DIAGNOSIS — G47.33 OSA (OBSTRUCTIVE SLEEP APNEA): Primary | ICD-10-CM

## 2024-02-01 DIAGNOSIS — E28.2 PCOS (POLYCYSTIC OVARIAN SYNDROME): ICD-10-CM

## 2024-02-01 DIAGNOSIS — E66.01 MORBID OBESITY WITH BMI OF 45.0-49.9, ADULT (HCC): ICD-10-CM

## 2024-02-01 PROCEDURE — 99203 OFFICE O/P NEW LOW 30 MIN: CPT | Performed by: SPECIALIST

## 2024-02-01 ASSESSMENT — SLEEP AND FATIGUE QUESTIONNAIRES
DO YOU HAVE DIFFICULTY WATCHING A MOVIE OR VIDEO BECAUSE YOU BECOME SLEEPY OR TIRED: NO
HOW LIKELY ARE YOU TO NOD OFF OR FALL ASLEEP WHILE LYING DOWN TO REST IN THE AFTERNOON WHEN CIRCUMSTANCES PERMIT: SLIGHT CHANCE OF DOZING
SELECT ANY OF THE FOLLOWING BEHAVIORS OBSERVED WHILE PATIENT ASLEEP: LOUD SNORING;LIGHT SNORING;GRINDING TEETH
DO YOU WORK SHIFTS: NO
HOW LIKELY ARE YOU TO NOD OFF OR FALL ASLEEP WHILE SITTING AND READING: 1
ESS TOTAL SCORE: 3
DO YOU HAVE DIFFICULTY OPERATING A MOTOR VEHICLE FOR SHORT DISTANCES (LESS THAN 100 MILES) BECAUSE YOU BECOME SLEEPY: NO
SELECT ANY OF THE FOLLOWING BEHAVIORS OBSERVED WHILE YOU ARE ASLEEP: GRINDING TEETH
DO YOU HAVE DIFFICULTY BEING AS ACTIVE AS YOU WANT TO BE IN THE EVENING BECAUSE YOU ARE SLEEPY OR TIRED: NO
HOW LIKELY ARE YOU TO NOD OFF OR FALL ASLEEP WHILE SITTING AND TALKING TO SOMEONE: WOULD NEVER DOZE
SELECT ANY OF THE FOLLOWING BEHAVIORS OBSERVED WHILE YOU ARE ASLEEP: LOUD SNORING
HOW LIKELY ARE YOU TO NOD OFF OR FALL ASLEEP WHEN YOU ARE A PASSENGER IN A CAR FOR AN HOUR WITHOUT A BREAK: 1
DO YOU GENERALLY HAVE DIFFICULTY REMEMBERING THINGS BECAUSE YOU ARE SLEEPY OR TIRED: NO
DO YOU HAVE DIFFICULTY CONCENTRATING ON THE THINGS YOU DO BECAUSE YOU ARE SLEEPY OR TIRED: YES, A LITTLE
AVERAGE NUMBER OF SLEEP HOURS: 6
HOW LIKELY ARE YOU TO NOD OFF OR FALL ASLEEP IN A CAR, WHILE STOPPED FOR A FEW MINUTES IN TRAFFIC: 0
DO YOU HAVE PROBLEMS WITH FREQUENT AWAKENINGS AT NIGHT: YES
DO YOU HAVE DIFFICULTY OPERATING A MOTOR VEHICLE FOR LONG DISTANCES (GREATER THAN 100 MILES) BECAUSE YOU BECOME SLEEPY: NO
HOW LIKELY ARE YOU TO NOD OFF OR FALL ASLEEP WHILE SITTING QUIETLY AFTER LUNCH WITHOUT ALCOHOL: WOULD NEVER DOZE
HOW LIKELY ARE YOU TO NOD OFF OR FALL ASLEEP WHILE SITTING INACTIVE IN A PUBLIC PLACE: 0
HOW LIKELY ARE YOU TO NOD OFF OR FALL ASLEEP WHILE SITTING AND READING: SLIGHT CHANCE OF DOZING
SELECT ANY OF THE FOLLOWING BEHAVIORS OBSERVED WHILE YOU ARE ASLEEP: LIGHT SNORING
DO YOU GET TOO LITTLE SLEEP AT NIGHT: DOES
WHAT TIME DO YOU USUALLY GO TO BED: 22:00
NUMBER OF TIMES YOU WAKE PER NIGHT: 2
ARE YOU BOTHERED BY WAKING UP TOO EARLY AND NOT BEING ABLE TO GET BACK TO SLEEP: YES
ARE YOU BOTHERED BY WAKING UP TOO EARLY AND NOT BEING ABLE TO GET BACK TO SLEEP: IS
HOW LIKELY ARE YOU TO NOD OFF OR FALL ASLEEP WHILE SITTING QUIETLY AFTER LUNCH WITHOUT ALCOHOL: 0
HOW LIKELY ARE YOU TO NOD OFF OR FALL ASLEEP IN A CAR, WHILE STOPPED FOR A FEW MINUTES IN TRAFFIC: WOULD NEVER DOZE
DO YOU HAVE DIFFICULTY VISITING YOUR FAMILY OR FRIENDS IN THEIR HOME BECAUSE YOU BECOME SLEEPY OR TIRED: NO
AVERAGE NUMBER OF SLEEP HOURS: 6
HOW LIKELY ARE YOU TO NOD OFF OR FALL ASLEEP WHILE SITTING AND TALKING TO SOMEONE: 0
DO YOU HAVE DIFFICULTY BEING AS ACTIVE AS YOU WANT TO BE IN THE MORNING BECAUSE YOU ARE SLEEPY OR TIRED: NO
HOW LIKELY ARE YOU TO NOD OFF OR FALL ASLEEP WHILE WATCHING TV: WOULD NEVER DOZE
HAS YOUR MOOD BEEN AFFECTED BECAUSE YOU ARE SLEEPY OR TIRED: NO
DO YOU TAKE NAPS: NO
DO YOU GET TOO LITTLE SLEEP AT NIGHT: YES
FOSQ SCORE: 19.5
HAS YOUR RELATIONSHIP WITH FAMILY, FRIENDS OR WORK COLLEAGUES BEEN AFFECTED BECAUSE YOU ARE SLEEPY OR TIRED: NO
HOW LIKELY ARE YOU TO NOD OFF OR FALL ASLEEP WHILE SITTING INACTIVE IN A PUBLIC PLACE: WOULD NEVER DOZE
HOW LIKELY ARE YOU TO NOD OFF OR FALL ASLEEP WHILE WATCHING TV: 0
HOW LIKELY ARE YOU TO NOD OFF OR FALL ASLEEP WHILE LYING DOWN TO REST IN THE AFTERNOON WHEN CIRCUMSTANCES PERMIT: 1
HOW LIKELY ARE YOU TO NOD OFF OR FALL ASLEEP WHEN YOU ARE A PASSENGER IN A CAR FOR AN HOUR WITHOUT A BREAK: SLIGHT CHANCE OF DOZING

## 2024-02-01 NOTE — PROGRESS NOTES
[facility]  [facility] [place]  [address] [facility] [address]  Dept: [phone]           [address]  [place] [address]  Tel.  [phone]  Fax. [phone] [address]  [place] [address]  Tel.  [phone]  Fax. [phone] [address] [place] [address]  Tel.  [phone]  Fax. [phone]       Chief Complaint       Chief Complaint   Patient presents with    Sleep Problem     NP ref'd by Shilo Reyna MD for MI consult       HPI      Nevaeh Rowe is 33 y.o. female seen for evaluation of a sleep disorder.  She had an initial evaluation in 2016 with a study demonstrating mild sleep disordered breathing characterized by an AHI of 7.9/h.  She was started on APAP 4-20 cm.  Was seeing Dr. Thomason at that time.  Last office note 7/13/2016.    The patient reports she has had bariatric surgery in 2019 and has lost approximately 85 pounds since that procedure.  Regained approximately 25.  Notes that with increased weight she is again snoring.      Currently retires 10 PM and is asleep by midnight; may awaken twice during the night.  Will often get out of bed at 6 AM.  Is not excessively sleepy at that time.  Does not nap during the day.      [place] Sleepiness Scale: 3    Has been told of snoring described as loud; reports frequent dreams.              2/1/2024    11:00 AM 2/1/2024     9:43 AM   Sleep Medicine   Sitting and reading  1   Watching TV  0   Sitting, inactive in a public place (e.g. a theatre or a meeting)  0   As a passenger in a car for an hour without a break  1   Lying down to rest in the afternoon when circumstances permit  1   Sitting and talking to someone  0   Sitting quietly after a lunch without alcohol  0   In a car, while stopped for a few minutes in traffic  0   [place] Sleepiness Score  3   Neck circumference (Inches) 17         Allergies   Allergen Reactions

## 2024-02-14 ENCOUNTER — PROCEDURE VISIT (OUTPATIENT)
Age: 34
End: 2024-02-14

## 2024-02-14 ENCOUNTER — HOSPITAL ENCOUNTER (OUTPATIENT)
Facility: HOSPITAL | Age: 34
Discharge: HOME | End: 2024-02-17

## 2024-02-14 DIAGNOSIS — G47.33 OSA (OBSTRUCTIVE SLEEP APNEA): Primary | ICD-10-CM

## 2024-02-14 NOTE — PROGRESS NOTES
[address]  [place] [address]  Tel.  [phone]  Fax. [phone] [address]  [place] [address]  Tel.  [phone]  Fax. [phone] [address] [place] [address]  Tel.  [phone]  Fax. [phone]       S>Nevaeh Rowe is a 33 y.o. female seen today to receive a home sleep testing unit (HST).    Patient was educated on proper hookup and operation of the HST.  Instruction forms and documentation were reviewed and signed.  The patient demonstrated good understanding of the HST.    O>    There were no vitals taken for this visit.      A>  No diagnosis found.      P>  General information regarding operations and maintenance of the device was provided.  She was provided information on sleep apnea including coresponding risk factors and the importance of proper treatment.   Follow-up appointment was made to return the HST. She will be contacted once the results have been reviewed.  She was asked to contact our office for any problems regarding her home sleep test study.

## 2024-02-29 ENCOUNTER — TELEPHONE (OUTPATIENT)
Age: 34
End: 2024-02-29

## 2024-02-29 NOTE — TELEPHONE ENCOUNTER
WatchPAT HSAT performed for potential sleep disordered breathing.  8 hours 52 minutes recorded in which 7 hours 54 minutes spent asleep; 28.4% of sleep in REM..  All sleep stages observed.  Patient slept supine and prone.    AHI: 3.1/h (4% desaturation definition).  Minimal SpO2 76%.  Snoring noted.    Impression: HSAT does not demonstrate significant sleep disordered breathing.  Weight reduction will be beneficial.    Sleep technologist: Please review HSAT results with the patient.

## 2024-04-23 ENCOUNTER — OFFICE VISIT (OUTPATIENT)
Facility: CLINIC | Age: 34
End: 2024-04-23
Payer: MEDICAID

## 2024-04-23 VITALS
RESPIRATION RATE: 18 BRPM | OXYGEN SATURATION: 98 % | WEIGHT: 262.3 LBS | DIASTOLIC BLOOD PRESSURE: 96 MMHG | BODY MASS INDEX: 46.48 KG/M2 | HEART RATE: 60 BPM | HEIGHT: 63 IN | TEMPERATURE: 98.2 F | SYSTOLIC BLOOD PRESSURE: 157 MMHG

## 2024-04-23 DIAGNOSIS — G47.33 OBSTRUCTIVE SLEEP APNEA: ICD-10-CM

## 2024-04-23 DIAGNOSIS — E28.2 PCOS (POLYCYSTIC OVARIAN SYNDROME): ICD-10-CM

## 2024-04-23 DIAGNOSIS — R73.02 IMPAIRED GLUCOSE TOLERANCE: ICD-10-CM

## 2024-04-23 DIAGNOSIS — I87.2 VENOUS INSUFFICIENCY: ICD-10-CM

## 2024-04-23 DIAGNOSIS — E66.01 MORBID OBESITY (HCC): Primary | ICD-10-CM

## 2024-04-23 DIAGNOSIS — I10 PRIMARY HYPERTENSION: ICD-10-CM

## 2024-04-23 PROCEDURE — 3077F SYST BP >= 140 MM HG: CPT | Performed by: INTERNAL MEDICINE

## 2024-04-23 PROCEDURE — 3080F DIAST BP >= 90 MM HG: CPT | Performed by: INTERNAL MEDICINE

## 2024-04-23 PROCEDURE — 99214 OFFICE O/P EST MOD 30 MIN: CPT | Performed by: INTERNAL MEDICINE

## 2024-04-23 SDOH — ECONOMIC STABILITY: FOOD INSECURITY: WITHIN THE PAST 12 MONTHS, THE FOOD YOU BOUGHT JUST DIDN'T LAST AND YOU DIDN'T HAVE MONEY TO GET MORE.: NEVER TRUE

## 2024-04-23 SDOH — ECONOMIC STABILITY: HOUSING INSECURITY
IN THE LAST 12 MONTHS, WAS THERE A TIME WHEN YOU DID NOT HAVE A STEADY PLACE TO SLEEP OR SLEPT IN A SHELTER (INCLUDING NOW)?: NO

## 2024-04-23 SDOH — ECONOMIC STABILITY: INCOME INSECURITY: HOW HARD IS IT FOR YOU TO PAY FOR THE VERY BASICS LIKE FOOD, HOUSING, MEDICAL CARE, AND HEATING?: NOT HARD AT ALL

## 2024-04-23 SDOH — ECONOMIC STABILITY: FOOD INSECURITY: WITHIN THE PAST 12 MONTHS, YOU WORRIED THAT YOUR FOOD WOULD RUN OUT BEFORE YOU GOT MONEY TO BUY MORE.: NEVER TRUE

## 2024-04-23 ASSESSMENT — PATIENT HEALTH QUESTIONNAIRE - PHQ9
SUM OF ALL RESPONSES TO PHQ QUESTIONS 1-9: 0
2. FEELING DOWN, DEPRESSED OR HOPELESS: NOT AT ALL
1. LITTLE INTEREST OR PLEASURE IN DOING THINGS: NOT AT ALL
SUM OF ALL RESPONSES TO PHQ9 QUESTIONS 1 & 2: 0

## 2024-04-23 ASSESSMENT — ANXIETY QUESTIONNAIRES
4. TROUBLE RELAXING: NOT AT ALL
IF YOU CHECKED OFF ANY PROBLEMS ON THIS QUESTIONNAIRE, HOW DIFFICULT HAVE THESE PROBLEMS MADE IT FOR YOU TO DO YOUR WORK, TAKE CARE OF THINGS AT HOME, OR GET ALONG WITH OTHER PEOPLE: NOT DIFFICULT AT ALL
5. BEING SO RESTLESS THAT IT IS HARD TO SIT STILL: NOT AT ALL
1. FEELING NERVOUS, ANXIOUS, OR ON EDGE: NOT AT ALL
6. BECOMING EASILY ANNOYED OR IRRITABLE: NOT AT ALL
7. FEELING AFRAID AS IF SOMETHING AWFUL MIGHT HAPPEN: NOT AT ALL
2. NOT BEING ABLE TO STOP OR CONTROL WORRYING: NOT AT ALL
3. WORRYING TOO MUCH ABOUT DIFFERENT THINGS: NOT AT ALL
GAD7 TOTAL SCORE: 0

## 2024-04-23 NOTE — PROGRESS NOTES
Chief Complaint   Patient presents with    Follow-up    Hypertension     \"Have you been to the ER, urgent care clinic since your last visit?  Hospitalized since your last visit?\"    NO    “Have you seen or consulted any other health care providers outside of [facility] since your last visit?”    NO     “Have you had a pap smear?”    Yes    No cervical cancer screening on file             Click Here for Release of Records Request

## 2024-04-29 DIAGNOSIS — F41.9 ANXIETY: Primary | ICD-10-CM

## 2024-04-29 PROBLEM — I10 PRIMARY HYPERTENSION: Status: ACTIVE | Noted: 2024-04-29

## 2024-04-29 RX ORDER — NORTRIPTYLINE HYDROCHLORIDE 10 MG/1
CAPSULE ORAL
Qty: 60 CAPSULE | Refills: 11 | Status: SHIPPED | OUTPATIENT
Start: 2024-04-29

## 2024-04-29 RX ORDER — OLMESARTAN MEDOXOMIL 20 MG/1
20 TABLET ORAL DAILY
Qty: 30 TABLET | Refills: 11 | Status: SHIPPED | OUTPATIENT
Start: 2024-04-29

## 2024-04-30 NOTE — PROGRESS NOTES
Comes in for return visit, stating that she has done reasonably well. She is still taking care of her mother, who is quite elderly and debilitated.  Unfortunately, the patient has not lost any meaningful weight. She has regained a significant amount of weight after gastric sleeve in 2019. She now hopes to lose additional weight, but is really not doing anything significant to achieve this.    She has a past history of vascular headaches, reactive airway disease, and primary hypertension.

## 2024-04-30 NOTE — PROGRESS NOTES
1. The patient definitely needs to lose weight.  This can be accomplished by eating meals, eliminating snacks and avoiding the consumption of processed carbohydrates. I do not think she has the wherewithal to be able to demonstrate enough self-control to lose weight, which she has already done. Hopefully this will change.  2. She does have a history of impaired glucose tolerance.  This will probably worsen over time given her current obesity.  Hemoglobin A1c will be checked on her return visit.  3. She has follow up with neurology, but could not give me the details of the conclusion.    4. It is a high probability she does have recurrence of her obstructive sleep apnea given the weight gain that has occurred.  5. She has chronic venous insufficiency leading to orthostatic swelling.  6. Her blood pressure remains elevated. According to the notes from my staff, she is not taking her antihypertensive medication.  I will resume this, but I will change to an ARB rather than an ACE inhibitor with a diuretic.

## 2024-04-30 NOTE — PROGRESS NOTES
[facility]  [address] [place] [address] [address]  [facility] [address]  Phone:  [phone]  Fax: [phone]       Chief Complaint   Patient presents with    Follow-up    Hypertension   .      SUBJECTIVE:    Nevaeh Rowe is a 33 y.o. female  Dictation on: 04/29/2024  9:17 PM by: BARBARA MCNAIR [[id number]]          Current Outpatient Medications   Medication Sig Dispense Refill    olmesartan (BENICAR) 20 MG tablet Take 1 tablet by mouth daily 30 tablet 11    fluticasone (FLONASE) 50 MCG/ACT nasal spray 2 sprays by Each Nostril route daily 16 g 11    rizatriptan (MAXALT) 10 MG tablet Take 1 tablet by mouth as needed for Migraine 30 tablet 1    Rimegepant Sulfate (NURTEC) 75 MG TBDP Take 75 mg by mouth as needed (Headache) 30 tablet 1    Multiple Vitamin (QUINTABS) TABS Take 1 tablet by mouth      omeprazole (PRILOSEC) 20 MG delayed release capsule Take 1 capsule by mouth daily      pantoprazole (PROTONIX) 20 MG tablet Take 2 tablets by mouth daily      nortriptyline (PAMELOR) 10 MG capsule 2 capsules at bedtime 60 capsule 11    albuterol sulfate HFA (PROVENTIL;VENTOLIN;PROAIR) 108 (90 Base) MCG/ACT inhaler Inhale 2 puffs into the lungs every 4 hours as needed      ergocalciferol (ERGOCALCIFEROL) 1.25 MG (88205 UT) capsule Take 1 capsule daily x 7 days, then take 1 capsule weekly x 7 weeks.  Indications: VITAMIN D DEFICIENCY (HIGH DOSE THERAPY)      Hydrocort-Pramoxine, Perianal, (PROCTOFOAM-HC) 1-1 % rectal foam Place 1 applicator rectally 2 times daily      metFORMIN (GLUCOPHAGE) 500 MG tablet Take 1 tablet by mouth 2 times daily (with meals)      methylPREDNISolone (MEDROL DOSEPACK) 4 MG tablet As directed      SUMAtriptan (IMITREX) 20 MG/ACT nasal spray 1 spray by Nasal route as needed      SUMAtriptan (IMITREX) 100 MG tablet TAKE 1 TABLET BY MOUTH ONCE AS NEEDED FOR MIGRAINE FOR UP TO 1 DOSE      topiramate (TOPAMAX) 50 MG tablet Take 1 tablet by mouth 2 times daily       No current

## 2024-08-15 ENCOUNTER — OFFICE VISIT (OUTPATIENT)
Facility: CLINIC | Age: 34
End: 2024-08-15
Payer: MEDICAID

## 2024-08-15 VITALS
BODY MASS INDEX: 45.8 KG/M2 | RESPIRATION RATE: 16 BRPM | HEIGHT: 63 IN | OXYGEN SATURATION: 97 % | HEART RATE: 79 BPM | SYSTOLIC BLOOD PRESSURE: 136 MMHG | DIASTOLIC BLOOD PRESSURE: 85 MMHG | TEMPERATURE: 98.6 F | WEIGHT: 258.5 LBS

## 2024-08-15 DIAGNOSIS — I10 PRIMARY HYPERTENSION: ICD-10-CM

## 2024-08-15 DIAGNOSIS — G47.33 OBSTRUCTIVE SLEEP APNEA: ICD-10-CM

## 2024-08-15 DIAGNOSIS — I87.2 VENOUS INSUFFICIENCY: ICD-10-CM

## 2024-08-15 DIAGNOSIS — E66.01 MORBID OBESITY (HCC): Primary | ICD-10-CM

## 2024-08-15 DIAGNOSIS — R73.02 IMPAIRED GLUCOSE TOLERANCE: ICD-10-CM

## 2024-08-15 PROCEDURE — 99214 OFFICE O/P EST MOD 30 MIN: CPT | Performed by: INTERNAL MEDICINE

## 2024-08-15 PROCEDURE — 3075F SYST BP GE 130 - 139MM HG: CPT | Performed by: INTERNAL MEDICINE

## 2024-08-15 PROCEDURE — 3079F DIAST BP 80-89 MM HG: CPT | Performed by: INTERNAL MEDICINE

## 2024-08-15 RX ORDER — SEMAGLUTIDE 0.25 MG/.5ML
0.25 INJECTION, SOLUTION SUBCUTANEOUS
Qty: 2 ML | Refills: 0 | Status: SHIPPED | OUTPATIENT
Start: 2024-08-15

## 2024-08-15 SDOH — ECONOMIC STABILITY: FOOD INSECURITY: WITHIN THE PAST 12 MONTHS, YOU WORRIED THAT YOUR FOOD WOULD RUN OUT BEFORE YOU GOT MONEY TO BUY MORE.: NEVER TRUE

## 2024-08-15 SDOH — ECONOMIC STABILITY: FOOD INSECURITY: WITHIN THE PAST 12 MONTHS, THE FOOD YOU BOUGHT JUST DIDN'T LAST AND YOU DIDN'T HAVE MONEY TO GET MORE.: NEVER TRUE

## 2024-08-15 SDOH — ECONOMIC STABILITY: INCOME INSECURITY: HOW HARD IS IT FOR YOU TO PAY FOR THE VERY BASICS LIKE FOOD, HOUSING, MEDICAL CARE, AND HEATING?: NOT HARD AT ALL

## 2024-08-15 ASSESSMENT — ANXIETY QUESTIONNAIRES
2. NOT BEING ABLE TO STOP OR CONTROL WORRYING: NOT AT ALL
3. WORRYING TOO MUCH ABOUT DIFFERENT THINGS: NOT AT ALL
1. FEELING NERVOUS, ANXIOUS, OR ON EDGE: NOT AT ALL
6. BECOMING EASILY ANNOYED OR IRRITABLE: NOT AT ALL
IF YOU CHECKED OFF ANY PROBLEMS ON THIS QUESTIONNAIRE, HOW DIFFICULT HAVE THESE PROBLEMS MADE IT FOR YOU TO DO YOUR WORK, TAKE CARE OF THINGS AT HOME, OR GET ALONG WITH OTHER PEOPLE: NOT DIFFICULT AT ALL
5. BEING SO RESTLESS THAT IT IS HARD TO SIT STILL: NOT AT ALL
4. TROUBLE RELAXING: NOT AT ALL

## 2024-08-15 ASSESSMENT — PATIENT HEALTH QUESTIONNAIRE - PHQ9
2. FEELING DOWN, DEPRESSED OR HOPELESS: NOT AT ALL
SUM OF ALL RESPONSES TO PHQ QUESTIONS 1-9: 0
1. LITTLE INTEREST OR PLEASURE IN DOING THINGS: NOT AT ALL
SUM OF ALL RESPONSES TO PHQ9 QUESTIONS 1 & 2: 0
SUM OF ALL RESPONSES TO PHQ QUESTIONS 1-9: 0

## 2024-08-15 NOTE — PROGRESS NOTES
[facility]  [address] [place] [address]  [address]  [facility] [address]  Phone:  [phone]  Fax: [phone]       Chief Complaint   Patient presents with    Follow-up    Weight Management   .      SUBJECTIVE:    Nevaeh Rowe is a 33 y.o. female  Dictation on: 08/15/2024  4:03 PM by: BARBARA MCNAIR [[id number]]          Current Outpatient Medications   Medication Sig Dispense Refill    nortriptyline (PAMELOR) 10 MG capsule 2 capsules at bedtime 60 capsule 11    olmesartan (BENICAR) 20 MG tablet Take 1 tablet by mouth daily 30 tablet 11    fluticasone (FLONASE) 50 MCG/ACT nasal spray 2 sprays by Each Nostril route daily 16 g 11    rizatriptan (MAXALT) 10 MG tablet Take 1 tablet by mouth as needed for Migraine 30 tablet 1    Rimegepant Sulfate (NURTEC) 75 MG TBDP Take 75 mg by mouth as needed (Headache) 30 tablet 1    Multiple Vitamin (QUINTABS) TABS Take 1 tablet by mouth      omeprazole (PRILOSEC) 20 MG delayed release capsule Take 1 capsule by mouth daily      pantoprazole (PROTONIX) 20 MG tablet Take 2 tablets by mouth daily       No current facility-administered medications for this visit.     Past Medical History:   Diagnosis Date    Asthma 2006    GERD (gastroesophageal reflux disease) 2017    History of PCOS     Hypertension     Migraines     Obesity      Past Surgical History:   Procedure Laterality Date    BARIATRIC SURGERY N/A 2019    CARPAL TUNNEL RELEASE Bilateral     CHOLECYSTECTOMY, LAPAROSCOPIC  2023     Allergies   Allergen Reactions    Latex Itching         REVIEW OF SYSTEMS:  General: negative for - chills or fever  ENT: negative for - headaches, nasal congestion or tinnitus  Respiratory: negative for - cough, hemoptysis, shortness of breath or wheezing  Cardiovascular : negative for - chest pain, edema, palpitations or shortness of breath  Gastrointestinal: negative for - abdominal pain, blood in stools, heartburn or nausea/vomiting  Genito-Urinary: no dysuria, trouble

## 2024-08-15 NOTE — PROGRESS NOTES
Patient ID: Kennedy Mishra [id number]        ALLERGIES:   Allergen Reactions   • Lobster   (Food) HIVES     Shell fish   • Fish Allergy   (Food Or Med) HIVES   • Shellfish Allergy   (Food Or Med) HIVES         Visit Vitals  /59 (BP Location: RUE - Right upper extremity, Patient Position: Sitting, Cuff Size: Regular)   Pulse 81   Resp 18   SpO2 95%                HPI    When he attempted to lower the dose of Allegra from 180 mg, every 12 hours to 180 mg, once daily the hives flared.    He resumed the twice a day Allegra schedule & is usually hive-free, although he does develop a small urtication on this dose here & there.  No trigger implicated.      Physical Exam   Constitutional: He is oriented to person, place, and time and well-developed, well-nourished, and in no distress. No distress.   HENT:   Head: Normocephalic and atraumatic.   Mouth/Throat: No oropharyngeal exudate.   Eyes: Pupils are equal, round, and reactive to light. Conjunctivae and EOM are normal. Right eye exhibits no discharge. Left eye exhibits no discharge. No scleral icterus.   Cardiovascular: Normal rate, regular rhythm and normal heart sounds.   Pulmonary/Chest: Effort normal and breath sounds normal. No stridor. No respiratory distress. He has no wheezes. He has no rales.   Musculoskeletal:      Cervical back: Normal range of motion and neck supple.   Neurological: He is alert and oriented to person, place, and time. Gait normal.   Skin: He is not diaphoretic.   Negative exam.   Psychiatric: Affect normal.   Nursing note and vitals reviewed.      Assessment/Plan:    Idiopathic urticaria  (primary encounter diagnosis)  Plan: He will remain on the Allegra at a dose of 180 mg, every 12 hours until his return visit to see me unless he wants to attempt a taper before that time.  I discussed the tapering protocol with him thoroughly.    He will see me in follow-up in about 2 - 3 months.                Current Outpatient Medications  Chief Complaint   Patient presents with    Follow-up    Weight Management   Patient states \" she wishes to discuss weight loss issue.\"  \"Have you been to the ER, urgent care clinic since your last visit?  Hospitalized since your last visit?\"    YES - When: approximately 2 months ago.  Where and Why: Urgent Care.    “Have you seen or consulted any other health care providers outside of [facility] since your last visit?”    NO     “Have you had a pap smear?”    YES - Where: [facility] [place]            Click Here for Release of Records Request     Medication Sig   • fluticasone propionate (Flovent HFA) 110 MCG/ACT inhaler Inhale 1 puff into the lungs 2 times daily.   • cetirizine (ZyrTEC) 10 MG tablet Take 1 tablet by mouth daily.   • fluticasone (FLONASE) 50 MCG/ACT nasal spray Spray 1 spray in each nostril 2 times daily.   • polyethylene glycol (MIRALAX) 17 GM/SCOOP powder Take 17 g by mouth daily.   • azithromycin (Zithromax Z-Ernesto) 250 MG tablet 2 tablets day one, then 1 tablet days 2-5   • albuterol 108 (90 Base) MCG/ACT inhaler Inhale 2 puffs into the lungs every 4 hours as needed for Shortness of Breath or Wheezing.   • fexofenadine (ALLEGRA) 180 MG tablet Take 1 tablet by mouth 2 times daily.   • Cholecalciferol 25 mcg (1,000 units) capsule Take 1 capsule by mouth daily.   • calcium carbonate (CALCIUM 600) 600 MG Tab CHEW 2 TS PO D UTD .   • Multiple Vitamins-Minerals (MULTIVITAMIN ADULT PO)      No current facility-administered medications for this visit.

## 2024-08-16 NOTE — PROGRESS NOTES
Comes in for return visit.  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 She is working on her master's currently.    She remains frustrated with her weight. She had a previous gastric sleeve done and went from 330 pounds down to 230 pounds.  She has since regained about 30 pounds.  She is having a difficult time maintaining this, which is not surprising.      She has a past history of hypertension, as well as vascular headaches, for which she sees a neurologist.  She also has a history of reactive airway disease.

## 2024-08-16 NOTE — PROGRESS NOTES
1. The patient definitely needs to lose weight.  She has already had a bypass. She has to change her eating habits by eating meals, eliminating snacks and avoiding the consumption of processed carbohydrates. I think this is very important.  I will attempt to get her a GLP1 agonist, although I think this will be an uphill alfonso at this point and would be of transient benefit, unless there is significant change in her behavior as relates to eating.  2. She has a history of impaired glucose tolerance, which was formerly diabetes, but since her weight loss this has regressed to this point and hopefully she will become eventually euglycemic.  3. She does have a history of obstructive sleep apnea, which has improved with weight reduction.  4. Blood pressure is excellent and no adjustment is made today.  5. She has chronic venous insufficiency, but this is mild at best.  Treatment is with knee-high compression stockings.

## 2024-09-25 ENCOUNTER — OFFICE VISIT (OUTPATIENT)
Age: 34
End: 2024-09-25
Payer: MEDICAID

## 2024-09-25 VITALS
OXYGEN SATURATION: 94 % | DIASTOLIC BLOOD PRESSURE: 85 MMHG | TEMPERATURE: 98.4 F | RESPIRATION RATE: 16 BRPM | BODY MASS INDEX: 45.74 KG/M2 | WEIGHT: 258.2 LBS | HEART RATE: 73 BPM | SYSTOLIC BLOOD PRESSURE: 129 MMHG

## 2024-09-25 DIAGNOSIS — E28.2 PCOS (POLYCYSTIC OVARIAN SYNDROME): Primary | ICD-10-CM

## 2024-09-25 PROCEDURE — 99203 OFFICE O/P NEW LOW 30 MIN: CPT | Performed by: OBSTETRICS & GYNECOLOGY

## 2024-09-25 PROCEDURE — 3079F DIAST BP 80-89 MM HG: CPT | Performed by: OBSTETRICS & GYNECOLOGY

## 2024-09-25 PROCEDURE — 3074F SYST BP LT 130 MM HG: CPT | Performed by: OBSTETRICS & GYNECOLOGY

## 2024-09-26 LAB
ALBUMIN SERPL-MCNC: 4 G/DL (ref 3.9–4.9)
ALP SERPL-CCNC: 49 IU/L (ref 44–121)
ALT SERPL-CCNC: 8 IU/L (ref 0–32)
AST SERPL-CCNC: 18 IU/L (ref 0–40)
BILIRUB SERPL-MCNC: 0.2 MG/DL (ref 0–1.2)
BUN SERPL-MCNC: 10 MG/DL (ref 6–20)
BUN/CREAT SERPL: 13 (ref 9–23)
CALCIUM SERPL-MCNC: 9.7 MG/DL (ref 8.7–10.2)
CHLORIDE SERPL-SCNC: 103 MMOL/L (ref 96–106)
CO2 SERPL-SCNC: 26 MMOL/L (ref 20–29)
CREAT SERPL-MCNC: 0.77 MG/DL (ref 0.57–1)
EGFRCR SERPLBLD CKD-EPI 2021: 104 ML/MIN/1.73
GLOBULIN SER CALC-MCNC: 2.8 G/DL (ref 1.5–4.5)
GLUCOSE SERPL-MCNC: 86 MG/DL (ref 70–99)
POTASSIUM SERPL-SCNC: 4.1 MMOL/L (ref 3.5–5.2)
PROLACTIN SERPL-MCNC: 9.3 NG/ML (ref 4.8–33.4)
PROT SERPL-MCNC: 6.8 G/DL (ref 6–8.5)
SODIUM SERPL-SCNC: 142 MMOL/L (ref 134–144)

## 2024-09-29 LAB
TESTOST FREE SERPL-MCNC: 0.2 PG/ML (ref 0–4.2)
TESTOST SERPL-MCNC: 24 NG/DL (ref 8–60)

## 2024-09-30 LAB — 17OHP SERPL-MCNC: 32 NG/DL

## 2025-02-18 ENCOUNTER — OFFICE VISIT (OUTPATIENT)
Facility: CLINIC | Age: 35
End: 2025-02-18
Payer: MEDICAID

## 2025-02-18 VITALS
OXYGEN SATURATION: 96 % | BODY MASS INDEX: 44.79 KG/M2 | SYSTOLIC BLOOD PRESSURE: 139 MMHG | HEIGHT: 63 IN | TEMPERATURE: 98.2 F | DIASTOLIC BLOOD PRESSURE: 86 MMHG | HEART RATE: 69 BPM | RESPIRATION RATE: 16 BRPM | WEIGHT: 252.8 LBS

## 2025-02-18 DIAGNOSIS — Z00.00 PHYSICAL EXAM: ICD-10-CM

## 2025-02-18 DIAGNOSIS — R73.02 IMPAIRED GLUCOSE TOLERANCE: Primary | ICD-10-CM

## 2025-02-18 PROCEDURE — 99214 OFFICE O/P EST MOD 30 MIN: CPT | Performed by: INTERNAL MEDICINE

## 2025-02-18 PROCEDURE — 36415 COLL VENOUS BLD VENIPUNCTURE: CPT | Performed by: INTERNAL MEDICINE

## 2025-02-18 PROCEDURE — 3079F DIAST BP 80-89 MM HG: CPT | Performed by: INTERNAL MEDICINE

## 2025-02-18 PROCEDURE — 3075F SYST BP GE 130 - 139MM HG: CPT | Performed by: INTERNAL MEDICINE

## 2025-02-18 SDOH — ECONOMIC STABILITY: FOOD INSECURITY: WITHIN THE PAST 12 MONTHS, THE FOOD YOU BOUGHT JUST DIDN'T LAST AND YOU DIDN'T HAVE MONEY TO GET MORE.: NEVER TRUE

## 2025-02-18 SDOH — ECONOMIC STABILITY: TRANSPORTATION INSECURITY
IN THE PAST 12 MONTHS, HAS THE LACK OF TRANSPORTATION KEPT YOU FROM MEDICAL APPOINTMENTS OR FROM GETTING MEDICATIONS?: NO

## 2025-02-18 SDOH — ECONOMIC STABILITY: TRANSPORTATION INSECURITY
IN THE PAST 12 MONTHS, HAS LACK OF TRANSPORTATION KEPT YOU FROM MEETINGS, WORK, OR FROM GETTING THINGS NEEDED FOR DAILY LIVING?: NO

## 2025-02-18 SDOH — ECONOMIC STABILITY: FOOD INSECURITY: WITHIN THE PAST 12 MONTHS, YOU WORRIED THAT YOUR FOOD WOULD RUN OUT BEFORE YOU GOT MONEY TO BUY MORE.: NEVER TRUE

## 2025-02-18 SDOH — ECONOMIC STABILITY: INCOME INSECURITY: IN THE LAST 12 MONTHS, WAS THERE A TIME WHEN YOU WERE NOT ABLE TO PAY THE MORTGAGE OR RENT ON TIME?: NO

## 2025-02-18 ASSESSMENT — ANXIETY QUESTIONNAIRES
2. NOT BEING ABLE TO STOP OR CONTROL WORRYING: NOT AT ALL
6. BECOMING EASILY ANNOYED OR IRRITABLE: NOT AT ALL
4. TROUBLE RELAXING: NOT AT ALL
5. BEING SO RESTLESS THAT IT IS HARD TO SIT STILL: NOT AT ALL
1. FEELING NERVOUS, ANXIOUS, OR ON EDGE: NOT AT ALL
IF YOU CHECKED OFF ANY PROBLEMS ON THIS QUESTIONNAIRE, HOW DIFFICULT HAVE THESE PROBLEMS MADE IT FOR YOU TO DO YOUR WORK, TAKE CARE OF THINGS AT HOME, OR GET ALONG WITH OTHER PEOPLE: NOT DIFFICULT AT ALL
3. WORRYING TOO MUCH ABOUT DIFFERENT THINGS: NOT AT ALL
GAD7 TOTAL SCORE: 0
7. FEELING AFRAID AS IF SOMETHING AWFUL MIGHT HAPPEN: NOT AT ALL

## 2025-02-18 ASSESSMENT — PATIENT HEALTH QUESTIONNAIRE - PHQ9
1. LITTLE INTEREST OR PLEASURE IN DOING THINGS: NOT AT ALL
SUM OF ALL RESPONSES TO PHQ QUESTIONS 1-9: 0
SUM OF ALL RESPONSES TO PHQ9 QUESTIONS 1 & 2: 0
SUM OF ALL RESPONSES TO PHQ QUESTIONS 1-9: 0
2. FEELING DOWN, DEPRESSED OR HOPELESS: NOT AT ALL
SUM OF ALL RESPONSES TO PHQ QUESTIONS 1-9: 0
SUM OF ALL RESPONSES TO PHQ QUESTIONS 1-9: 0

## 2025-02-18 NOTE — PROGRESS NOTES
Chief Complaint   Patient presents with    Hypertension       Patient states \" she is present for a follow-  \"Have you been to the ER, urgent care clinic since your last visit?  Hospitalized since your last visit?\"    NO    “Have you seen or consulted any other health care providers outside our system since your last visit?”    NO     “Have you had a pap smear?”    NO    No cervical cancer screening on file          up.\"

## 2025-02-19 LAB
ALBUMIN SERPL-MCNC: 4.1 G/DL (ref 3.9–4.9)
ALP SERPL-CCNC: 49 IU/L (ref 44–121)
ALT SERPL-CCNC: 8 IU/L (ref 0–32)
AST SERPL-CCNC: 19 IU/L (ref 0–40)
BASOPHILS # BLD AUTO: 0 X10E3/UL (ref 0–0.2)
BASOPHILS NFR BLD AUTO: 1 %
BILIRUB SERPL-MCNC: 0.3 MG/DL (ref 0–1.2)
BUN SERPL-MCNC: 9 MG/DL (ref 6–20)
BUN/CREAT SERPL: 12 (ref 9–23)
CALCIUM SERPL-MCNC: 9.5 MG/DL (ref 8.7–10.2)
CHLORIDE SERPL-SCNC: 102 MMOL/L (ref 96–106)
CHOLEST SERPL-MCNC: 157 MG/DL (ref 100–199)
CO2 SERPL-SCNC: 23 MMOL/L (ref 20–29)
CREAT SERPL-MCNC: 0.78 MG/DL (ref 0.57–1)
EGFRCR SERPLBLD CKD-EPI 2021: 102 ML/MIN/1.73
EOSINOPHIL # BLD AUTO: 0.1 X10E3/UL (ref 0–0.4)
EOSINOPHIL NFR BLD AUTO: 2 %
ERYTHROCYTE [DISTWIDTH] IN BLOOD BY AUTOMATED COUNT: 15 % (ref 11.7–15.4)
GLOBULIN SER CALC-MCNC: 3 G/DL (ref 1.5–4.5)
GLUCOSE SERPL-MCNC: 85 MG/DL (ref 70–99)
HBA1C MFR BLD: 6 % (ref 4.8–5.6)
HCT VFR BLD AUTO: 37 % (ref 34–46.6)
HDLC SERPL-MCNC: 50 MG/DL
HGB BLD-MCNC: 11.6 G/DL (ref 11.1–15.9)
IMM GRANULOCYTES # BLD AUTO: 0 X10E3/UL (ref 0–0.1)
IMM GRANULOCYTES NFR BLD AUTO: 0 %
LDLC SERPL CALC-MCNC: 86 MG/DL (ref 0–99)
LYMPHOCYTES # BLD AUTO: 2.8 X10E3/UL (ref 0.7–3.1)
LYMPHOCYTES NFR BLD AUTO: 62 %
MCH RBC QN AUTO: 26.7 PG (ref 26.6–33)
MCHC RBC AUTO-ENTMCNC: 31.4 G/DL (ref 31.5–35.7)
MCV RBC AUTO: 85 FL (ref 79–97)
MONOCYTES # BLD AUTO: 0.4 X10E3/UL (ref 0.1–0.9)
MONOCYTES NFR BLD AUTO: 9 %
NEUTROPHILS # BLD AUTO: 1.1 X10E3/UL (ref 1.4–7)
NEUTROPHILS NFR BLD AUTO: 26 %
PLATELET # BLD AUTO: 354 X10E3/UL (ref 150–450)
POTASSIUM SERPL-SCNC: 4.3 MMOL/L (ref 3.5–5.2)
PROT SERPL-MCNC: 7.1 G/DL (ref 6–8.5)
RBC # BLD AUTO: 4.35 X10E6/UL (ref 3.77–5.28)
SODIUM SERPL-SCNC: 139 MMOL/L (ref 134–144)
TRIGL SERPL-MCNC: 117 MG/DL (ref 0–149)
VLDLC SERPL CALC-MCNC: 21 MG/DL (ref 5–40)
WBC # BLD AUTO: 4.4 X10E3/UL (ref 3.4–10.8)

## 2025-02-19 RX ORDER — RIZATRIPTAN BENZOATE 10 MG/1
10 TABLET ORAL PRN
Qty: 30 TABLET | Refills: 11 | Status: SHIPPED | OUTPATIENT
Start: 2025-02-19

## 2025-02-19 RX ORDER — RIMEGEPANT SULFATE 75 MG/75MG
75 TABLET, ORALLY DISINTEGRATING ORAL PRN
Qty: 30 TABLET | Refills: 4 | Status: SHIPPED | OUTPATIENT
Start: 2025-02-19

## 2025-02-24 NOTE — PROGRESS NOTES
[facility]  [facility]   [address]  [facility] [address]  Phone:  [phone]  Fax: [phone]       Chief Complaint   Patient presents with    Hypertension   .      SUBJECTIVE:      History of Present Illness  The patient is a 34-year-old female who presents for evaluation of weight management, dysphagia, headaches, carpal tunnel syndrome, abdominal pain, and prediabetes.    She has been actively engaged in weight loss efforts, including increased physical activity with her dog and dietary modifications. Despite these efforts, she has only achieved a modest weight loss. She underwent bariatric surgery in 2019, which resulted in a significant weight reduction from 318 to 320 pounds to a low of 230 pounds. However, she has since regained some weight and currently weighs 252 pounds. Her goal is to lose an additional 20 to 30 pounds before May 2025. She has been reducing her sugar intake and limiting her coffee consumption to one cup per day. She has also been avoiding late-night eating and has been focusing on consuming more water. She has been making efforts to improve her diet and has been avoiding processed cheese. She has been experiencing back problems, which she attributes to her top-heavy physique.    She continues to experience dysphagia, particularly with certain types of pills, despite being prescribed easy-to-swallow capsules. She has been managing this by consuming thicker substances such as apple sauce or yogurt to facilitate swallowing. She has been avoiding steak unless it is extremely tender due to the difficulty in swallowing.    She has been experiencing recurrent headaches, which have been increasing in frequency. She has been unable to secure an appointment with a neurologist due to long wait times. She has been managing her headaches with rizatriptan and Nurtec, but these medications have only been effective when taken in double doses. She has been making

## 2025-03-15 ENCOUNTER — RESULTS FOLLOW-UP (OUTPATIENT)
Facility: CLINIC | Age: 35
End: 2025-03-15

## 2025-03-31 NOTE — PROGRESS NOTES
Nevaeh Rowe is a 34 y.o. female presents for a problem visit. Her main concerns today include: PCOS? Follow up    Chief Complaint   Patient presents with    Menstrual Problem     Pt states she presents today for a 'follow up from last ov'- would like to discuss test results and next steps.    Irregular cycles    SA- Not using contraception. States she has had adverse reactions to certain methods of contraception in the past    Patient's last menstrual period was 03/11/2025 (exact date).  Birth Control: none    1. Have you been to the ER, urgent care clinic, or hospitalized since your last visit? No    2. Have you seen or consulted any other health care providers outside of the [facility] since your last visit? No  Lisa Cohn LPN  4/2/2025  10:34 AM

## 2025-04-01 NOTE — PROGRESS NOTES
Problem Visit  Chief Complaint   Patient presents with    Menstrual Problem       Nevaeh Rowe is a 34 y.o.  presenting for problem visit. Her main concern today is follow up from visit in September 2024.    Her main concern at that time was irregular periods and discussion of PCOS. Per OV, \"she notes that was diagnosed with PCOS in 2008. She has tried multiple medications including metformin, birth control pills. She notes that she would bleed heavier when she was on birth control pills. She notes that symptoms have gotten worse including weight gain. She underwent bariatric surgery in 2019. She lost approximately 60# and then has gained approximately 20# back. She notes that she used to have VERY irregular cycles. She notes that she used to go months/years without a cycle. Cycles did start to become more regular after bariatric surgery. She does note that she skipped her period in May/June however had a normal cycle July, August, and September. She notes that periods are not q 28 days but that she is having them one time per month. She notes that they are moderate in flow. She reports that she has previously had a PCOS workup including labs. Testosterone was noted to be elevated at 47. All other labs were unremarkable.\"     We reviewed normal bloodwork drawn in September including normal testosterone level. She reports that she has a history of abnormal testosterone. She reports that she has had irregular but monthly cycles (she did skip October and November). She is worried about next steps. She reports that she is not currently in a relationship but reports that she has previously been  and had unprotected intercourse. She reports that she never had a positive pregnancy test. She reports that her previous partners have fathered children.    She reports that she has previously tried birth control however it made her bleeding worse as opposed to better. She has continued to work on weight

## 2025-04-02 ENCOUNTER — OFFICE VISIT (OUTPATIENT)
Age: 35
End: 2025-04-02
Payer: MEDICAID

## 2025-04-02 VITALS
RESPIRATION RATE: 18 BRPM | OXYGEN SATURATION: 96 % | HEART RATE: 64 BPM | DIASTOLIC BLOOD PRESSURE: 79 MMHG | HEIGHT: 63 IN | TEMPERATURE: 98.1 F | BODY MASS INDEX: 44.72 KG/M2 | WEIGHT: 252.4 LBS | SYSTOLIC BLOOD PRESSURE: 127 MMHG

## 2025-04-02 DIAGNOSIS — N93.9 ABNORMAL UTERINE BLEEDING (AUB): Primary | ICD-10-CM

## 2025-04-02 PROCEDURE — 3078F DIAST BP <80 MM HG: CPT | Performed by: OBSTETRICS & GYNECOLOGY

## 2025-04-02 PROCEDURE — 3074F SYST BP LT 130 MM HG: CPT | Performed by: OBSTETRICS & GYNECOLOGY

## 2025-04-02 PROCEDURE — 99213 OFFICE O/P EST LOW 20 MIN: CPT | Performed by: OBSTETRICS & GYNECOLOGY

## 2025-08-09 LAB
APPEARANCE UR: CLEAR
BACTERIA #/AREA URNS HPF: NORMAL /[HPF]
BILIRUB UR QL STRIP: NEGATIVE
CASTS URNS QL MICRO: NORMAL /LPF
COLOR UR: YELLOW
EPI CELLS #/AREA URNS HPF: NORMAL /HPF (ref 0–10)
GLUCOSE UR QL STRIP: NEGATIVE
HGB UR QL STRIP: NEGATIVE
KETONES UR QL STRIP: NEGATIVE
LEUKOCYTE ESTERASE UR QL STRIP: NEGATIVE
MICRO URNS: NORMAL
MICRO URNS: NORMAL
NITRITE UR QL STRIP: NEGATIVE
PH UR STRIP: 6.5 [PH] (ref 5–7.5)
PROT UR QL STRIP: NEGATIVE
RBC #/AREA URNS HPF: NORMAL /HPF (ref 0–2)
SP GR UR STRIP: 1.02 (ref 1–1.03)
UROBILINOGEN UR STRIP-MCNC: 0.2 MG/DL (ref 0.2–1)
WBC #/AREA URNS HPF: NORMAL /HPF (ref 0–5)

## 2025-08-22 ENCOUNTER — OFFICE VISIT (OUTPATIENT)
Facility: CLINIC | Age: 35
End: 2025-08-22
Payer: MEDICAID

## 2025-08-22 VITALS
HEART RATE: 60 BPM | OXYGEN SATURATION: 98 % | RESPIRATION RATE: 16 BRPM | HEIGHT: 63 IN | TEMPERATURE: 99.1 F | WEIGHT: 253.2 LBS | DIASTOLIC BLOOD PRESSURE: 86 MMHG | SYSTOLIC BLOOD PRESSURE: 136 MMHG | BODY MASS INDEX: 44.86 KG/M2

## 2025-08-22 DIAGNOSIS — R73.02 IGT (IMPAIRED GLUCOSE TOLERANCE): Primary | ICD-10-CM

## 2025-08-22 DIAGNOSIS — E66.01 MORBID OBESITY (HCC): ICD-10-CM

## 2025-08-22 DIAGNOSIS — A64 STD (FEMALE): ICD-10-CM

## 2025-08-22 DIAGNOSIS — G47.33 OBSTRUCTIVE SLEEP APNEA: ICD-10-CM

## 2025-08-22 PROCEDURE — 99214 OFFICE O/P EST MOD 30 MIN: CPT | Performed by: INTERNAL MEDICINE

## 2025-08-22 PROCEDURE — 3075F SYST BP GE 130 - 139MM HG: CPT | Performed by: INTERNAL MEDICINE

## 2025-08-22 PROCEDURE — 3079F DIAST BP 80-89 MM HG: CPT | Performed by: INTERNAL MEDICINE

## 2025-08-23 LAB
HBV SURFACE AB SER QL: NON REACTIVE
HCV IGG SERPL QL IA: NON REACTIVE
HIV 1+2 AB+HIV1 P24 AG SERPL QL IA: NON REACTIVE

## 2025-08-24 ENCOUNTER — RESULTS FOLLOW-UP (OUTPATIENT)
Facility: CLINIC | Age: 35
End: 2025-08-24

## 2025-08-25 LAB
EST. AVERAGE GLUCOSE BLD GHB EST-MCNC: 123 MG/DL
HBA1C MFR BLD: 5.9 % (ref 4.8–5.6)

## 2025-08-26 LAB
C TRACH RRNA SPEC QL NAA+PROBE: NEGATIVE
N GONORRHOEA RRNA SPEC QL NAA+PROBE: NEGATIVE
T VAGINALIS RRNA SPEC QL NAA+PROBE: NEGATIVE

## (undated) DEVICE — BW-412T DISP COMBO CLEANING BRUSH: Brand: SINGLE USE COMBINATION CLEANING BRUSH

## (undated) DEVICE — BAG BELONG PT PERS CLEAR HANDL

## (undated) DEVICE — CATH IV AUTOGRD BC BLU 22GA 25 -- INSYTE

## (undated) DEVICE — AIRLIFE™ U/CONNECT-IT OXYGEN TUBING 7 FEET (2.1 M) CRUSH-RESISTANT OXYGEN TUBING, VINYL TIPPED: Brand: AIRLIFE™

## (undated) DEVICE — SOLIDIFIER FLUID 3000 CC ABSORB

## (undated) DEVICE — KIT IV STRT W CHLORAPREP PD 1ML

## (undated) DEVICE — Z DISCONTINUED USE 2751540 TUBING IRRIG L10IN DISP PMP ENDOGATOR

## (undated) DEVICE — CONNECTOR TBNG AUX H2O JET DISP FOR OLY 160/180 SER

## (undated) DEVICE — ENDO CARRY-ON PROCEDURE KIT INCLUDES ENZYMATIC SPONGE, GAUZE, BIOHAZARD LABEL, TRAY, LUBRICANT, DIRTY SCOPE LABEL, WATER LABEL, TRAY, DRAWSTRING PAD, AND DEFENDO 4-PIECE KIT.: Brand: ENDO CARRY-ON PROCEDURE KIT

## (undated) DEVICE — SET ADMIN 16ML TBNG L100IN 2 Y INJ SITE IV PIGGY BK DISP

## (undated) DEVICE — NEEDLE HYPO 18GA L1.5IN PNK S STL HUB POLYPR SHLD REG BVL

## (undated) DEVICE — 1200 GUARD II KIT W/5MM TUBE W/O VAC TUBE: Brand: GUARDIAN

## (undated) DEVICE — KENDALL RADIOLUCENT FOAM MONITORING ELECTRODE -RECTANGULAR SHAPE: Brand: KENDALL

## (undated) DEVICE — SET EXTN TBNG L BOR 4 W STPCOCK ST 32IN PRIMING VOL 6ML

## (undated) DEVICE — QUILTED PREMIUM COMFORT UNDERPAD,EXTRA HEAVY: Brand: WINGS

## (undated) DEVICE — SYRINGE MED 20ML STD CLR PLAS LUERLOCK TIP N CTRL DISP